# Patient Record
Sex: FEMALE | Race: WHITE | Employment: UNEMPLOYED | URBAN - METROPOLITAN AREA
[De-identification: names, ages, dates, MRNs, and addresses within clinical notes are randomized per-mention and may not be internally consistent; named-entity substitution may affect disease eponyms.]

---

## 2020-07-08 ENCOUNTER — OFFICE VISIT (OUTPATIENT)
Dept: OBGYN CLINIC | Age: 70
End: 2020-07-08
Payer: MEDICARE

## 2020-07-08 VITALS
BODY MASS INDEX: 29.3 KG/M2 | DIASTOLIC BLOOD PRESSURE: 76 MMHG | WEIGHT: 171.6 LBS | HEIGHT: 64 IN | SYSTOLIC BLOOD PRESSURE: 122 MMHG

## 2020-07-08 PROCEDURE — G8399 PT W/DXA RESULTS DOCUMENT: HCPCS | Performed by: OBSTETRICS & GYNECOLOGY

## 2020-07-08 PROCEDURE — 4040F PNEUMOC VAC/ADMIN/RCVD: CPT | Performed by: OBSTETRICS & GYNECOLOGY

## 2020-07-08 PROCEDURE — 1123F ACP DISCUSS/DSCN MKR DOCD: CPT | Performed by: OBSTETRICS & GYNECOLOGY

## 2020-07-08 PROCEDURE — G8417 CALC BMI ABV UP PARAM F/U: HCPCS | Performed by: OBSTETRICS & GYNECOLOGY

## 2020-07-08 PROCEDURE — 99213 OFFICE O/P EST LOW 20 MIN: CPT | Performed by: OBSTETRICS & GYNECOLOGY

## 2020-07-08 PROCEDURE — G8427 DOCREV CUR MEDS BY ELIG CLIN: HCPCS | Performed by: OBSTETRICS & GYNECOLOGY

## 2020-07-08 PROCEDURE — 1036F TOBACCO NON-USER: CPT | Performed by: OBSTETRICS & GYNECOLOGY

## 2020-07-08 PROCEDURE — 1090F PRES/ABSN URINE INCON ASSESS: CPT | Performed by: OBSTETRICS & GYNECOLOGY

## 2020-07-08 PROCEDURE — 3017F COLORECTAL CA SCREEN DOC REV: CPT | Performed by: OBSTETRICS & GYNECOLOGY

## 2020-07-08 RX ORDER — CHOLECALCIFEROL (VITAMIN D3) 125 MCG
1 CAPSULE ORAL DAILY
COMMUNITY

## 2020-07-08 ASSESSMENT — PATIENT HEALTH QUESTIONNAIRE - PHQ9
2. FEELING DOWN, DEPRESSED OR HOPELESS: 0
SUM OF ALL RESPONSES TO PHQ QUESTIONS 1-9: 0
1. LITTLE INTEREST OR PLEASURE IN DOING THINGS: 0
SUM OF ALL RESPONSES TO PHQ9 QUESTIONS 1 & 2: 0
SUM OF ALL RESPONSES TO PHQ QUESTIONS 1-9: 0

## 2020-07-08 ASSESSMENT — ENCOUNTER SYMPTOMS
ABDOMINAL PAIN: 0
APNEA: 0
SHORTNESS OF BREATH: 0

## 2020-07-09 NOTE — PROGRESS NOTES
Subjective:      Patient ID:  Olga Chris is a 69 y.o. female with chief complaint of:  Chief Complaint   Patient presents with    New Patient     np pt here c/o possible bladder or uterine prolapse       Patient presents with concern that she may have some prolapse in the vagina. She was previously diagnosed by myself with rectocele. patient denies any bladder or bowel complications. She is just concerned that this could be worsening and wanted to be sure      Past Medical History:   Diagnosis Date    Allergic rhinitis     Visit for screening mammogram 12/08/08    cat 2     Past Surgical History:   Procedure Laterality Date    APPENDECTOMY  1997    BREAST SURGERY Left 12/11/13    Vacuum assisted bx of the left breast    COLONOSCOPY      No report available    HYSTERECTOMY      supracervical  bilateral salpingo-oophorectomy, but she has part of her cervix left. Family History   Problem Relation Age of Onset    Cancer Sister         thyroid     Current Outpatient Medications on File Prior to Visit   Medication Sig Dispense Refill    Multiple Vitamin (MULTI VITAMIN DAILY PO) Take 1 tablet by mouth      ECHINACEA-ZINC-VITAMIN C PO Take 1 tablet by mouth      Cholecalciferol (VITAMIN D) 125 MCG (5000 UT) CAPS Take 1 tablet by mouth daily       No current facility-administered medications on file prior to visit. Allergies:  Chocolate    Review of Systems   Constitutional: Negative for fatigue and fever. Respiratory: Negative for apnea and shortness of breath. Cardiovascular: Negative for chest pain and palpitations. Gastrointestinal: Negative for abdominal pain. Genitourinary: Negative for difficulty urinating, dysuria, pelvic pain, vaginal bleeding, vaginal discharge and vaginal pain (vaginal pressure). Neurological: Negative for dizziness, weakness and light-headedness.        Objective:   /76   Ht 5' 4\" (1.626 m)   Wt 171 lb 9.6 oz (77.8 kg)   BMI 29.46 kg/m²      Physical

## 2023-03-28 PROBLEM — J30.9 ALLERGIC RHINITIS: Status: ACTIVE | Noted: 2023-03-28

## 2023-03-28 PROBLEM — Z13.89 SCREENING FOR MULTIPLE CONDITIONS: Status: ACTIVE | Noted: 2023-03-28

## 2023-03-28 PROBLEM — N81.6 RECTOCELE: Status: ACTIVE | Noted: 2023-03-28

## 2023-03-28 PROBLEM — E04.2 MULTIPLE THYROID NODULES: Status: ACTIVE | Noted: 2023-03-28

## 2023-03-28 PROBLEM — E05.00 GRAVES' DISEASE: Status: ACTIVE | Noted: 2023-03-28

## 2023-03-28 PROBLEM — Z71.89 CARDIAC RISK COUNSELING: Status: ACTIVE | Noted: 2023-03-28

## 2023-03-28 PROBLEM — K64.0 GRADE I HEMORRHOIDS: Status: ACTIVE | Noted: 2023-03-28

## 2023-03-28 PROBLEM — Z00.00 ROUTINE ADULT HEALTH MAINTENANCE: Status: ACTIVE | Noted: 2023-03-28

## 2023-03-28 PROBLEM — M85.9 DISORDER OF BONE DENSITY AND STRUCTURE, UNSPECIFIED: Status: ACTIVE | Noted: 2023-03-28

## 2023-03-28 PROBLEM — R03.0 BLOOD PRESSURE ELEVATED WITHOUT HISTORY OF HTN: Status: ACTIVE | Noted: 2023-03-28

## 2023-03-28 PROBLEM — N28.1 ACQUIRED RENAL CYST: Status: ACTIVE | Noted: 2023-03-28

## 2023-03-28 PROBLEM — E05.90 HYPERTHYROIDISM: Status: ACTIVE | Noted: 2023-03-28

## 2023-03-28 PROBLEM — R73.03 PREDIABETES: Status: ACTIVE | Noted: 2023-03-28

## 2023-03-28 PROBLEM — E55.9 VITAMIN D DEFICIENCY: Status: ACTIVE | Noted: 2023-03-28

## 2023-03-28 PROBLEM — N81.9 PROLAPSE OF FEMALE PELVIC ORGANS: Status: ACTIVE | Noted: 2023-03-28

## 2023-03-28 PROBLEM — E05.90 SUBCLINICAL HYPERTHYROIDISM: Status: RESOLVED | Noted: 2023-03-28 | Resolved: 2023-03-28

## 2023-03-28 PROBLEM — E78.2 HYPERLIPIDEMIA, MIXED: Status: ACTIVE | Noted: 2023-03-28

## 2023-03-28 PROBLEM — K57.30 DIVERTICULOSIS OF COLON: Status: ACTIVE | Noted: 2023-03-28

## 2023-03-28 PROBLEM — E05.90 SUBCLINICAL HYPERTHYROIDISM: Status: ACTIVE | Noted: 2023-03-28

## 2023-03-28 PROBLEM — Z71.89 ADVANCED DIRECTIVES, COUNSELING/DISCUSSION: Status: ACTIVE | Noted: 2023-03-28

## 2023-04-10 ENCOUNTER — TELEPHONE (OUTPATIENT)
Dept: PRIMARY CARE | Facility: CLINIC | Age: 73
End: 2023-04-10
Payer: MEDICARE

## 2023-04-10 DIAGNOSIS — E05.90 HYPERTHYROIDISM: ICD-10-CM

## 2023-04-10 DIAGNOSIS — R73.03 PREDIABETES: ICD-10-CM

## 2023-04-10 DIAGNOSIS — E55.9 VITAMIN D DEFICIENCY: ICD-10-CM

## 2023-04-10 DIAGNOSIS — E78.2 HYPERLIPIDEMIA, MIXED: ICD-10-CM

## 2023-04-10 DIAGNOSIS — M85.9 DISORDER OF BONE DENSITY AND STRUCTURE, UNSPECIFIED: Primary | ICD-10-CM

## 2023-04-10 NOTE — TELEPHONE ENCOUNTER
Relayed to patient   Verbally understands.   Advised to tell lab she doesn't want to  do TSH again

## 2023-04-10 NOTE — TELEPHONE ENCOUNTER
Pt left Vm  Pt stated she has an appointment on 5/2 w/SR  Pt just had lab work done at [facility] by the endocrinologist, Dr. Sanchez- TSH, T4 and T3   Pt stated she is supposed to have blood work done before her visit with you  Pt wants you not to include the thyroid tests since they were just done  Should I order all the other  lab tests to be done?   Please advise

## 2023-04-18 LAB
ALANINE AMINOTRANSFERASE (SGPT) (U/L) IN SER/PLAS: 11 U/L (ref 7–45)
ALBUMIN (G/DL) IN SER/PLAS: 4 G/DL (ref 3.4–5)
ALBUMIN (MG/L) IN URINE: 18.6 MG/L
ALBUMIN/CREATININE (UG/MG) IN URINE: 11.2 UG/MG CRT (ref 0–30)
ALKALINE PHOSPHATASE (U/L) IN SER/PLAS: 71 U/L (ref 33–136)
ANION GAP IN SER/PLAS: 13 MMOL/L (ref 10–20)
APPEARANCE, URINE: CLEAR
ASPARTATE AMINOTRANSFERASE (SGOT) (U/L) IN SER/PLAS: 14 U/L (ref 9–39)
BACTERIA, URINE: ABNORMAL /HPF
BASOPHILS (10*3/UL) IN BLOOD BY AUTOMATED COUNT: 0.04 X10E9/L (ref 0–0.1)
BASOPHILS/100 LEUKOCYTES IN BLOOD BY AUTOMATED COUNT: 0.4 % (ref 0–2)
BILIRUBIN TOTAL (MG/DL) IN SER/PLAS: 0.6 MG/DL (ref 0–1.2)
BILIRUBIN, URINE: NEGATIVE
BLOOD, URINE: ABNORMAL
CALCIDIOL (25 OH VITAMIN D3) (NG/ML) IN SER/PLAS: 46 NG/ML
CALCIUM (MG/DL) IN SER/PLAS: 8.8 MG/DL (ref 8.6–10.3)
CARBON DIOXIDE, TOTAL (MMOL/L) IN SER/PLAS: 29 MMOL/L (ref 21–32)
CHLORIDE (MMOL/L) IN SER/PLAS: 103 MMOL/L (ref 98–107)
CHOLESTEROL (MG/DL) IN SER/PLAS: 228 MG/DL (ref 0–199)
CHOLESTEROL IN HDL (MG/DL) IN SER/PLAS: 60.2 MG/DL
CHOLESTEROL/HDL RATIO: 3.8
COLOR, URINE: YELLOW
CREATININE (MG/DL) IN SER/PLAS: 0.98 MG/DL (ref 0.5–1.05)
CREATININE (MG/DL) IN URINE: 166 MG/DL (ref 20–320)
EOSINOPHILS (10*3/UL) IN BLOOD BY AUTOMATED COUNT: 0.57 X10E9/L (ref 0–0.4)
EOSINOPHILS/100 LEUKOCYTES IN BLOOD BY AUTOMATED COUNT: 6 % (ref 0–6)
ERYTHROCYTE DISTRIBUTION WIDTH (RATIO) BY AUTOMATED COUNT: 13.9 % (ref 11.5–14.5)
ERYTHROCYTE MEAN CORPUSCULAR HEMOGLOBIN CONCENTRATION (G/DL) BY AUTOMATED: 31.8 G/DL (ref 32–36)
ERYTHROCYTE MEAN CORPUSCULAR VOLUME (FL) BY AUTOMATED COUNT: 96 FL (ref 80–100)
ERYTHROCYTES (10*6/UL) IN BLOOD BY AUTOMATED COUNT: 4.18 X10E12/L (ref 4–5.2)
ESTIMATED AVERAGE GLUCOSE FOR HBA1C: 126 MG/DL
GFR FEMALE: 60 ML/MIN/1.73M2
GLUCOSE (MG/DL) IN SER/PLAS: 103 MG/DL (ref 74–99)
GLUCOSE, URINE: NEGATIVE MG/DL
HEMATOCRIT (%) IN BLOOD BY AUTOMATED COUNT: 40.3 % (ref 36–46)
HEMOGLOBIN (G/DL) IN BLOOD: 12.8 G/DL (ref 12–16)
HEMOGLOBIN A1C/HEMOGLOBIN TOTAL IN BLOOD: 6 %
IMMATURE GRANULOCYTES/100 LEUKOCYTES IN BLOOD BY AUTOMATED COUNT: 0.3 % (ref 0–0.9)
KETONES, URINE: NEGATIVE MG/DL
LDL: 137 MG/DL (ref 0–99)
LEUKOCYTE ESTERASE, URINE: ABNORMAL
LEUKOCYTES (10*3/UL) IN BLOOD BY AUTOMATED COUNT: 9.4 X10E9/L (ref 4.4–11.3)
LYMPHOCYTES (10*3/UL) IN BLOOD BY AUTOMATED COUNT: 1.92 X10E9/L (ref 0.8–3)
LYMPHOCYTES/100 LEUKOCYTES IN BLOOD BY AUTOMATED COUNT: 20.4 % (ref 13–44)
MONOCYTES (10*3/UL) IN BLOOD BY AUTOMATED COUNT: 0.77 X10E9/L (ref 0.05–0.8)
MONOCYTES/100 LEUKOCYTES IN BLOOD BY AUTOMATED COUNT: 8.2 % (ref 2–10)
MUCUS, URINE: ABNORMAL /LPF
NEUTROPHILS (10*3/UL) IN BLOOD BY AUTOMATED COUNT: 6.1 X10E9/L (ref 1.6–5.5)
NEUTROPHILS/100 LEUKOCYTES IN BLOOD BY AUTOMATED COUNT: 64.7 % (ref 40–80)
NITRITE, URINE: NEGATIVE
PH, URINE: 5 (ref 5–8)
PLATELETS (10*3/UL) IN BLOOD AUTOMATED COUNT: 318 X10E9/L (ref 150–450)
POTASSIUM (MMOL/L) IN SER/PLAS: 4 MMOL/L (ref 3.5–5.3)
PROTEIN TOTAL: 7.1 G/DL (ref 6.4–8.2)
PROTEIN, URINE: NEGATIVE MG/DL
RBC, URINE: 3 /HPF (ref 0–5)
SODIUM (MMOL/L) IN SER/PLAS: 141 MMOL/L (ref 136–145)
SPECIFIC GRAVITY, URINE: 1.02 (ref 1–1.03)
SQUAMOUS EPITHELIAL CELLS, URINE: 1 /HPF
THYROTROPIN (MIU/L) IN SER/PLAS BY DETECTION LIMIT <= 0.05 MIU/L: 4.1 MIU/L (ref 0.44–3.98)
THYROXINE (T4) FREE (NG/DL) IN SER/PLAS: 0.79 NG/DL (ref 0.61–1.12)
TRIGLYCERIDE (MG/DL) IN SER/PLAS: 155 MG/DL (ref 0–149)
UREA NITROGEN (MG/DL) IN SER/PLAS: 18 MG/DL (ref 6–23)
UROBILINOGEN, URINE: <2 MG/DL (ref 0–1.9)
VLDL: 31 MG/DL (ref 0–40)
WBC, URINE: 2 /HPF (ref 0–5)

## 2023-05-02 ENCOUNTER — OFFICE VISIT (OUTPATIENT)
Dept: PRIMARY CARE | Facility: CLINIC | Age: 73
End: 2023-05-02
Payer: MEDICARE

## 2023-05-02 VITALS
BODY MASS INDEX: 31.05 KG/M2 | HEIGHT: 63 IN | OXYGEN SATURATION: 96 % | DIASTOLIC BLOOD PRESSURE: 85 MMHG | HEART RATE: 88 BPM | TEMPERATURE: 98.2 F | WEIGHT: 175.25 LBS | SYSTOLIC BLOOD PRESSURE: 128 MMHG

## 2023-05-02 DIAGNOSIS — Z00.00 ROUTINE ADULT HEALTH MAINTENANCE: Primary | ICD-10-CM

## 2023-05-02 DIAGNOSIS — Z13.89 SCREENING FOR MULTIPLE CONDITIONS: ICD-10-CM

## 2023-05-02 DIAGNOSIS — R03.0 BLOOD PRESSURE ELEVATED WITHOUT HISTORY OF HTN: ICD-10-CM

## 2023-05-02 DIAGNOSIS — Z71.89 CARDIAC RISK COUNSELING: ICD-10-CM

## 2023-05-02 DIAGNOSIS — E78.2 HYPERLIPIDEMIA, MIXED: ICD-10-CM

## 2023-05-02 DIAGNOSIS — K58.9 IRRITABLE BOWEL SYNDROME, UNSPECIFIED TYPE: ICD-10-CM

## 2023-05-02 DIAGNOSIS — L23.7 POISON IVY DERMATITIS: ICD-10-CM

## 2023-05-02 DIAGNOSIS — Z12.31 ENCOUNTER FOR SCREENING MAMMOGRAM FOR BREAST CANCER: ICD-10-CM

## 2023-05-02 DIAGNOSIS — E55.9 VITAMIN D DEFICIENCY: ICD-10-CM

## 2023-05-02 DIAGNOSIS — R73.01 IFG (IMPAIRED FASTING GLUCOSE): ICD-10-CM

## 2023-05-02 DIAGNOSIS — N28.1 ACQUIRED RENAL CYST: ICD-10-CM

## 2023-05-02 DIAGNOSIS — Z71.89 ADVANCED DIRECTIVES, COUNSELING/DISCUSSION: ICD-10-CM

## 2023-05-02 DIAGNOSIS — E05.90 HYPERTHYROIDISM: ICD-10-CM

## 2023-05-02 PROCEDURE — 1159F MED LIST DOCD IN RCRD: CPT | Performed by: INTERNAL MEDICINE

## 2023-05-02 PROCEDURE — G0439 PPPS, SUBSEQ VISIT: HCPCS | Performed by: INTERNAL MEDICINE

## 2023-05-02 PROCEDURE — 99497 ADVNCD CARE PLAN 30 MIN: CPT | Performed by: INTERNAL MEDICINE

## 2023-05-02 PROCEDURE — 99214 OFFICE O/P EST MOD 30 MIN: CPT | Performed by: INTERNAL MEDICINE

## 2023-05-02 PROCEDURE — G0444 DEPRESSION SCREEN ANNUAL: HCPCS | Performed by: INTERNAL MEDICINE

## 2023-05-02 PROCEDURE — G0446 INTENS BEHAVE THER CARDIO DX: HCPCS | Performed by: INTERNAL MEDICINE

## 2023-05-02 PROCEDURE — 1160F RVW MEDS BY RX/DR IN RCRD: CPT | Performed by: INTERNAL MEDICINE

## 2023-05-02 PROCEDURE — 1036F TOBACCO NON-USER: CPT | Performed by: INTERNAL MEDICINE

## 2023-05-02 PROCEDURE — 3008F BODY MASS INDEX DOCD: CPT | Performed by: INTERNAL MEDICINE

## 2023-05-02 RX ORDER — METHIMAZOLE 5 MG/1
2.5 TABLET ORAL DAILY
COMMUNITY

## 2023-05-02 RX ORDER — ZINC SULFATE 50(220)MG
50 CAPSULE ORAL DAILY
COMMUNITY

## 2023-05-02 RX ORDER — IBUPROFEN/PSEUDOEPHEDRINE HCL 200MG-30MG
1 TABLET ORAL AS NEEDED
COMMUNITY

## 2023-05-02 RX ORDER — IBUPROFEN 100 MG/5ML
1 SUSPENSION, ORAL (FINAL DOSE FORM) ORAL DAILY
COMMUNITY
Start: 2020-04-13

## 2023-05-02 RX ORDER — HYDROCORTISONE 1 %
1 CREAM (GRAM) TOPICAL AS NEEDED
COMMUNITY
Start: 2022-09-17

## 2023-05-02 RX ORDER — PREDNISONE 20 MG/1
20 TABLET ORAL 2 TIMES DAILY
Qty: 10 TABLET | Refills: 3 | Status: SHIPPED | OUTPATIENT
Start: 2023-05-02 | End: 2024-05-07 | Stop reason: WASHOUT

## 2023-05-02 RX ORDER — CHOLECALCIFEROL (VITAMIN D3) 125 MCG
125 CAPSULE ORAL DAILY
COMMUNITY
Start: 2019-08-13

## 2023-05-02 RX ORDER — PREDNISONE 20 MG/1
2 TABLET ORAL DAILY
COMMUNITY
Start: 2022-09-17 | End: 2023-05-02 | Stop reason: SDUPTHER

## 2023-05-02 ASSESSMENT — PATIENT HEALTH QUESTIONNAIRE - PHQ9
SUM OF ALL RESPONSES TO PHQ9 QUESTIONS 1 AND 2: 0
1. LITTLE INTEREST OR PLEASURE IN DOING THINGS: NOT AT ALL
2. FEELING DOWN, DEPRESSED OR HOPELESS: NOT AT ALL

## 2023-05-02 NOTE — ASSESSMENT & PLAN NOTE
Annual Wellness exam completed   Preventive Health history reviewed:  Vaccines today: none, but will think about TDAP  Labs ordered    Mammogram ordered  Cscope due 2024  Depression Screening done  Advanced Directives Discussion Completed  Cardiovascular risk discussed and if needed, lifestyle modifications recommended, including nutritional choices, exercise, and elimination of habits contributing to risk.  We agreed on a plan to reduce the current cardiovascular risk.  See ecalc ASCVD Risk  Plus for data discussed regarding risk and risk reduction opportunities.  Aspirin use/disuse was discussed after reviewing the updated guidelines.

## 2023-05-02 NOTE — PROGRESS NOTES
Assessment and Plan:  Problem List Items Addressed This Visit          High    Advanced directives, counseling/discussion    Overview     5/2/23: HCPOA: Sanjuanita Diaz (Daughter), Son (Kavon/Dayo Diaz)         Cardiac risk counseling    Overview     5/2/23: Current 10-Year ASCVD Risk:  14.7% - Intermediate Risk    ASA not rec'd based on current guidelines         Routine adult health maintenance - Primary    Overview     Adacel 10/06, Declined Tetanus vaccine  Declined COVID, Influenza, Shingles or Pneumonia vaccine  Cscope 2014 (10yrs)  Mamm 2018; 8/3/20; 8/6/21, 9/12/22  BMD 2018; 8/6/20, 9/12/22  S/p supracervical hysterectomy and BSO  4/21: Current 10-Year ASCVD Risk:  10.63% - Intermediate Risk         Current Assessment & Plan     Annual Wellness exam completed   Preventive Health history reviewed:  Vaccines today: none, but will think about TDAP  Labs ordered    Mammogram ordered  Cscope due 2024  Depression Screening done  Advanced Directives Discussion Completed  Cardiovascular risk discussed and if needed, lifestyle modifications recommended, including nutritional choices, exercise, and elimination of habits contributing to risk.  We agreed on a plan to reduce the current cardiovascular risk.  See ecalc ASCVD Risk  Plus for data discussed regarding risk and risk reduction opportunities.  Aspirin use/disuse was discussed after reviewing the updated guidelines.            Screening for multiple conditions    Overview     Depression screening completed              Medium    Acquired renal cyst    Overview     5/21 CT Urogram: 1.  Opacified portions of bilateral ureters appear unremarkable.  2. No significant hydronephrosis.  3. Left parapelvic cysts with additional tiny subcentimeter bilateral renal hypodense lesions which are too small to characterize see CT, however statistically likely represent cysts.  4. Mild bilateral pelviectasis.  US 9/12/22: Parapelvic midpole cyst measures 16 x 14 x 13 mm  today, compared to 9 x 12 x 13 mm previously, mildly larger         Current Assessment & Plan     US to assess growth         Relevant Orders    US renal complete    Blood pressure elevated without history of HTN    Overview     Goal <130/70  8/20: her bp : 165/ 103 hr 97           our bp : 143/84 hr 93  BP Controlled at home         BMI 31.0-31.9,adult    Encounter for screening mammogram for breast cancer    Relevant Orders    BI mammo bilateral screening tomosynthesis    Hyperlipidemia, mixed    Overview     Goal LDL <100  Managed with diet  On Fish Oil             Relevant Orders    Lipid Panel    Hyperthyroidism    Overview     s/p ENDO consult 8/22: She is clinically euthyroid but thyroid indices have increased substantially since April. This may reflect decreased suppression from iodine, although dose of liquid iodine she describes is quite low. It is quite possible that the activity of her Graves' disease, assuming this is the cause, has increased. This should be reflected in her thyroid-stimulating immunoglobulin result.   Started Tapazole in 2022  Managed by Endo [facility] (Dr Sanchez)         Relevant Orders    TSH with reflex to Free T4 if abnormal    Comprehensive Metabolic Panel    CBC and Auto Differential    Lipid Panel    IFG (impaired fasting glucose)    Overview     4/23 HBA1C IS 6%  Managed with diet         Relevant Orders    Urinalysis with Reflex Microscopic    Hemoglobin A1c    Albumin, urine, random    Irritable bowel syndrome    Current Assessment & Plan     Try FODMAP diet  Imodium prn         Vitamin D deficiency    Overview     Goal 40-50  ON supplement         Relevant Orders    Vitamin D, Total    Comprehensive Metabolic Panel    CBC and Auto Differential     Other Visit Diagnoses       Poison ivy dermatitis        prn prednisone    Relevant Medications    predniSONE (Deltasone) 20 mg tablet            Chief Complaint:   Medicare Wellness Exam/Comprehensive Problem Focused Follow Up and  Physical Exam    HPI:medicare wellness  Having some diarrhea, long standing  Started on Tapazole since last seen  Seeing Endo    Renal US reviewed  Slight growth of cyst    Labs reviewed:  Component      Latest Ref Rng 4/18/2023   WBC      4.4 - 11.3 x10E9/L 9.4    RBC      4.00 - 5.20 x10E12/L 4.18    HEMOGLOBIN      12.0 - 16.0 g/dL 12.8    HEMATOCRIT      36.0 - 46.0 % 40.3    MCV      80 - 100 fL 96    MCHC      32.0 - 36.0 g/dL 31.8 (L)    Platelets      150 - 450 x10E9/L 318    RED CELL DISTRIBUTION WIDTH      11.5 - 14.5 % 13.9    Neutrophils %      40.0 - 80.0 % 64.7    Immature Granulocytes %, Automated      0.0 - 0.9 % 0.3    Lymphocytes %      13.0 - 44.0 % 20.4    Monocytes %      2.0 - 10.0 % 8.2    Eosinophils %      0.0 - 6.0 % 6.0    Basophils %      0.0 - 2.0 % 0.4    Neutrophils Absolute      1.60 - 5.50 x10E9/L 6.10 (H)    Lymphocytes Absolute      0.80 - 3.00 x10E9/L 1.92    Monocytes Absolute      0.05 - 0.80 x10E9/L 0.77    Eosinophils Absolute      0.00 - 0.40 x10E9/L 0.57 (H)    Basophils Absolute      0.00 - 0.10 x10E9/L 0.04    GLUCOSE      74 - 99 mg/dL 103 (H)    SODIUM      136 - 145 mmol/L 141    POTASSIUM      3.5 - 5.3 mmol/L 4.0    CHLORIDE      98 - 107 mmol/L 103    Bicarbonate      21 - 32 mmol/L 29    Anion Gap      10 - 20 mmol/L 13    Blood Urea Nitrogen      6 - 23 mg/dL 18    Creatinine      0.50 - 1.05 mg/dL 0.98    GFR Female      >90 mL/min/1.73m2 60    Calcium      8.6 - 10.3 mg/dL 8.8    Albumin      3.4 - 5.0 g/dL 4.0    Alkaline Phosphatase      33 - 136 U/L 71    Total Protein      6.4 - 8.2 g/dL 7.1    AST      9 - 39 U/L 14    Bilirubin Total      0.0 - 1.2 mg/dL 0.6    ALT      7 - 45 U/L 11    Color, Urine      STRAW,YELLOW  YELLOW    Appearance, Urine      CLEAR  CLEAR    Specific Gravity, Urine      1.005 - 1.035  1.019    pH, Urine      5.0 - 8.0  5.0    Protein, Urine      NEGATIVE mg/dL NEGATIVE    Glucose, Urine      NEGATIVE mg/dL NEGATIVE    Blood, Urine       NEGATIVE  SMALL(1+) !    Ketones, Urine      NEGATIVE mg/dL NEGATIVE    Bilirubin, Urine      NEGATIVE  NEGATIVE    Urobilinogen, Urine      0.0 - 1.9 mg/dL <2.0    Nitrite, Urine      NEGATIVE  NEGATIVE    Leukocyte Esterase, Urine      NEGATIVE  SMALL (1+) !    CHOLESTEROL      0 - 199 mg/dL 228 (H)    HDL CHOLESTEROL      mg/dL 60.2    Cholesterol/HDL Ratio 3.8    LDL      0 - 99 mg/dL 137 (H)    VLDL      0 - 40 mg/dL 31    TRIGLYCERIDES      0 - 149 mg/dL 155 (H)    WBC, Urine      0 - 5 /HPF 2    RBC, Urine      0 - 5 /HPF 3    Squamous Epithelial Cells, Urine      /HPF 1    Bacteria, Urine      /HPF 1+ !    Mucus, Urine      /LPF 1+    ALBUMIN (MG/L) IN URINE      Not Established mg/L 18.6    Albumin/Creatine Ratio      0.0 - 30.0 ug/mg crt 11.2    Creatinine, Urine Random      20.0 - 320.0 mg/dL 166.0    Hemoglobin A1C      % 6.0 !    Estimated Average Glucose      MG/    Vitamin D, 25-Hydroxy, Total      ng/mL 46    Thyroid Stimulating Hormone      0.44 - 3.98 mIU/L 4.10 (H)    Thyroxine, Free      0.61 - 1.12 ng/dL 0.79    [facility] at [facility]  T3/T4 normal also    Patient Care Team:  Zara Calvin MD as PCP - General  [facility]   Eyal Sanchez ([facility])    Active Problem List  Patient Active Problem List   Diagnosis    Acquired renal cyst    Allergic rhinitis    Blood pressure elevated without history of HTN    Disorder of bone density and structure, unspecified    Diverticulosis of colon    Grade I hemorrhoids    Hyperthyroidism    Hyperlipidemia, mixed    Multiple thyroid nodules    IFG (impaired fasting glucose)    Prolapse of female pelvic organs    Rectocele    Vitamin D deficiency    Advanced directives, counseling/discussion    BMI 31.0-31.9,adult    Cardiac risk counseling    Routine adult health maintenance    Screening for multiple conditions    Encounter for screening mammogram for breast cancer    Irritable bowel syndrome       Comprehensive  Medical/Surgical/Social/Family History  Past Medical History:   Diagnosis Date    H/O bone density study     9/12/22: Lowest T score -1.6 10-Year Fracture Risk: Major Osteoporotic Fracture 17.9% Hip Fracture                7.5% 3/18: -2.1 FRAX 18, 3.9% 8/6/20: T score -1.8.  FRAX scores:  Major Osteoporotic Fracture: 17.8%                                                            Hip Fracture:                5.6%    H/O CT scan     8/20: CT calcium score=0 Linear subpleural atelectasis is identified within the lingula and lower lobes 5/21 CT Urogram: 1.  Opacified portions of bilateral ureters appear unremarkable. 2. No significant hydronephrosis. 3. Left parapelvic cysts with additional tiny subcentimeter bilateral renal probable cysts 4. Mild bilateral pelviectasis. 5. Extensive colonic diverticulosis    H/O diagnostic ultrasound     US THyroid 4/20: Stable multinodular goiter since December 2018. No suspicious nodules are demonstrated 12/18: RIGHT LOBE:predominantly solid nodule in the lower pole of the right upper lobe. LEFT LOBE:A mixed solid and cystic nodule in the mid left thyroid lobe  2 x 1.1 x 1.4 cm. Another mixed solid cystic nodule left thyroid lobe 1.8 x 1.3 x 1.1 cm. A. solid nodule isthmus 2 x 1.7 x 1.8 cm.    H/O diagnostic ultrasound     US thyroid: 4/16/22:  RIGHT LOBE: spongiform nodule which measures 2.1 x 1.6 x 2.4 cm. It is essentially unchanged. LEFT LOBE: 2 spongiform nodules which measure 1.9 x 1.2 x 1.5 cm and 1.8 x 1.0 x 1.3 cm and are centrally unchanged. A 3rd spongiform nodule is seen in the inferior pole the left lobe of the thyroid which measures 1.4 x 0.9 x 1.3 cm. It has decreased in size    H/O diagnostic ultrasound     US kidney 4/21: Mild nonspecific apparent left-sided hydronephrosis. Small parapelvic left renal cyst, 12mm. Otherwise unremarkable exam US bldder 2012 (-) US abd 12/18: normal US kidney 9/12/22: Parapelvic midpole cyst measures 16 x 14 x 13 mm today, compared to  9 x 12 x 13 mm previously, mildly larger    H/O echocardiogram     : There is mild upper septal left  ventricular hypertrophy. Left ventricular systolic function is normal. EF = 69   5% (2D biplane) Normal left ventricular diastolic function.  - The right ventricle is normal in size. Right ventricular systolic function is  normal.  - There are no significant valvular abnormalities.  - RVSP is 19 mmHg c/w normal PA pressure. Estimated RApressure is 0 mmHg     Past Surgical History:   Procedure Laterality Date    APPENDECTOMY  08/10/2018    Appendectomy    CATARACT EXTRACTION  08/10/2018    Cataract Surgery    COLONOSCOPY  2018    2014: [facility]; Dr. Pleitez: internal hemorrhoids, diveticulosis    FINE NEEDLE ASPIRATION  2019: FNA thyroid: Benign follicular cells, cyst contents and colloid.    PARTIAL HYSTERECTOMY  2019    supracervical hysterectomy and BSO     Social History     Social History Narrative        Homemaker, 3 kids    Retired    Nonsmoker    Occasional ETOH    ---    Family History:    M: DM, cataracts, CAD/MI ( 64)    F: Bladder CA,cataracts ( 93)    S: DM, Thyroid CA, Dementia    Son :  Gout,  Pneumonia age 50     Tobacco/Alcohol/Opioid use, as well as Illicit Drug Use was screened for/reviewed and documented in Social Documentation section of the chart and medication list as appropriate    Allergies and Medications  Uledi  Current Outpatient Medications   Medication Instructions    ascorbic acid (Vitamin C) 1,000 mg tablet 1 tablet, oral, Daily    cholecalciferol (VITAMIN D-3) 125 mcg, oral, Daily    hydrocortisone 1 % cream 1 Application, Topical, As needed    melatonin 3 mg tablet,disintegrating 1 tablet, oral, As needed    methIMAzole (TAPAZOLE) 5 mg, oral, Daily    predniSONE (DELTASONE) 20 mg, oral, 2 times daily    QUERCETIN ORAL 1 tablet, oral, As needed    zinc sulfate (Zincate) 220 (50 Zn) MG capsule 50 mg of elemental zinc, oral, Daily  "    Medications and Supplements  prescribed by me and other practitioners or clinical pharmacist (such as prescriptions, OTC's, herbal therapies and supplements) were reviewed and documented in the medical record.      Activities of Daily Living  In your present state of health, do you have any difficulty performing the following activities?:   Preparing food and eating?: No  Bathing yourself: No  Getting dressed: No  Using the toilet:No  Moving around from place to place: No  In the past year have you fallen or had a near fall?:No  Able to manage finances independently: Yes  Able to perform grocery shopping: Yes  Able to manage medications independently: Yes  Able to do housework independently: Yes  Patient self-assessment of health status? Good    Depression Screen  (Note: if answer to either of the following is \"Yes\", then a more complete depression screening is indicated)   Q1: Over the past two weeks, have you felt down, depressed or hopeless? No  Q2: Over the past two weeks, have you felt little interest or pleasure in doing things? No    Current exercise habits: daily exercise routine   Dietary issues discussed: Yes  Hearing difficulties: No  Safe in current home environment: Yes  Visual Acuity assessed: No  Cognitive Impairment No    Advance directives  Advanced Care Planning (including a Living Will, Healthcare POA, as well as specific end of life choices and/or directives), was discussed for approximately 16 minutes with the patient and/or surrogate, voluntarily, and documented in the Problem List of the medical record.     Cardiac Risk Assessment  Cardiovascular risk was discussed and, if needed, lifestyle modifications recommended, including nutritional choices, exercise, and elimination of habits contributing to risk. We agreed on a plan to reduce the current cardiovascular risk based on above discussion as needed.  Aspirin use/disuse was discussed and documented in the Problem List of the medical " "record after reviewing the updated guidelines below:    Consider low dose Aspirin ( mg) use if the benefit for cardiovascular disease prevention outweighs risk for bleeding complications.   In general, low dose ASA should be considered:  In patients WITHOUT prior MI/stroke/PAD (primary prevention):   a. Age <60: Use if 10-year cardiovascular disease risk >20%, with discussion of risks and benefits with patient  b. Age 60-<70: Use if 10-year cardiovascular disease risk >20% and low bleeding (e.g., gastrointenstinal) risk, with discussion of risks and benefits with patient  c. Age >=70: Do not use    In patients WITH prior MI/stroke/PAD (secondary prevention):   Generally use unless extremely high bleeding (e.g., gastrointenstinal) risk, with discussion of risks and benefits with patient    ROS otherwise negative aside from what was mentioned above in HPI.    Vitals  /85   Pulse 88   Temp 36.8 °C (98.2 °F)   Ht 1.6 m (5' 3\")   Wt 79.5 kg (175 lb 4 oz)   SpO2 96%   BMI 31.04 kg/m²   Body mass index is 31.04 kg/m².  Physical Exam  Gen: Alert, NAD  HEENT:  Unremarkable  Neck:  No ILDEFONSO  Respiratory:  Lungs CTAB  Cardiovascular:  Heart RRR  Neuro:  Gross motor and sensory intact  Skin:  No suspicious lesions present  Breast: No masses, or axillary lymphadenopathy  Gyn: Mild bladder prolapse; no vaginal D/C. No external vaginal or anal/perineal lesions (Pt declined chaperone)    During the course of the visit the patient was educated and counseled about age appropriate screening and preventive services. Completed preventive screenings were documented in the chart and orders were placed for outstanding screenings/procedures as documented in the Assessment and Plan.    Patient Instructions (the written plan) was given to the patient at check out.  "

## 2023-11-16 ENCOUNTER — OFFICE VISIT (OUTPATIENT)
Dept: ORTHOPEDIC SURGERY | Facility: CLINIC | Age: 73
End: 2023-11-16
Payer: MEDICARE

## 2023-11-16 ENCOUNTER — ANCILLARY PROCEDURE (OUTPATIENT)
Dept: RADIOLOGY | Facility: CLINIC | Age: 73
End: 2023-11-16
Payer: MEDICARE

## 2023-11-16 DIAGNOSIS — M25.561 RIGHT KNEE PAIN, UNSPECIFIED CHRONICITY: ICD-10-CM

## 2023-11-16 DIAGNOSIS — M17.11 PRIMARY OSTEOARTHRITIS OF RIGHT KNEE: Primary | ICD-10-CM

## 2023-11-16 PROCEDURE — 73562 X-RAY EXAM OF KNEE 3: CPT | Mod: RT

## 2023-11-16 PROCEDURE — 99203 OFFICE O/P NEW LOW 30 MIN: CPT | Performed by: ORTHOPAEDIC SURGERY

## 2023-11-16 PROCEDURE — 1036F TOBACCO NON-USER: CPT | Performed by: ORTHOPAEDIC SURGERY

## 2023-11-16 PROCEDURE — 99213 OFFICE O/P EST LOW 20 MIN: CPT | Mod: PO,25 | Performed by: ORTHOPAEDIC SURGERY

## 2023-11-16 PROCEDURE — 1159F MED LIST DOCD IN RCRD: CPT | Performed by: ORTHOPAEDIC SURGERY

## 2023-11-16 PROCEDURE — 1126F AMNT PAIN NOTED NONE PRSNT: CPT | Performed by: ORTHOPAEDIC SURGERY

## 2023-11-16 PROCEDURE — 1160F RVW MEDS BY RX/DR IN RCRD: CPT | Performed by: ORTHOPAEDIC SURGERY

## 2023-11-16 PROCEDURE — 73562 X-RAY EXAM OF KNEE 3: CPT | Mod: RIGHT SIDE | Performed by: ORTHOPAEDIC SURGERY

## 2023-11-16 PROCEDURE — 3008F BODY MASS INDEX DOCD: CPT | Performed by: ORTHOPAEDIC SURGERY
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
## 2024-03-19 ENCOUNTER — LAB (OUTPATIENT)
Dept: LAB | Facility: LAB | Age: 74
End: 2024-03-19
Payer: MEDICARE

## 2024-03-19 DIAGNOSIS — E55.9 VITAMIN D DEFICIENCY: ICD-10-CM

## 2024-03-19 DIAGNOSIS — E05.90 HYPERTHYROIDISM: ICD-10-CM

## 2024-03-19 DIAGNOSIS — R73.03 PREDIABETES: ICD-10-CM

## 2024-03-19 DIAGNOSIS — R73.01 IFG (IMPAIRED FASTING GLUCOSE): ICD-10-CM

## 2024-03-19 DIAGNOSIS — M85.9 DISORDER OF BONE DENSITY AND STRUCTURE, UNSPECIFIED: ICD-10-CM

## 2024-03-19 DIAGNOSIS — E78.2 HYPERLIPIDEMIA, MIXED: ICD-10-CM

## 2024-03-19 LAB
25(OH)D3 SERPL-MCNC: 43 NG/ML (ref 30–100)
ALBUMIN SERPL BCP-MCNC: 3.9 G/DL (ref 3.4–5)
ALP SERPL-CCNC: 71 U/L (ref 33–136)
ALT SERPL W P-5'-P-CCNC: 12 U/L (ref 7–45)
ANION GAP SERPL CALC-SCNC: 12 MMOL/L (ref 10–20)
APPEARANCE UR: CLEAR
AST SERPL W P-5'-P-CCNC: 13 U/L (ref 9–39)
BACTERIA #/AREA URNS AUTO: ABNORMAL /HPF
BASOPHILS # BLD AUTO: 0.05 X10*3/UL (ref 0–0.1)
BASOPHILS NFR BLD AUTO: 0.6 %
BILIRUB SERPL-MCNC: 0.7 MG/DL (ref 0–1.2)
BILIRUB UR STRIP.AUTO-MCNC: NEGATIVE MG/DL
BUN SERPL-MCNC: 21 MG/DL (ref 6–23)
CALCIUM SERPL-MCNC: 9 MG/DL (ref 8.6–10.3)
CHLORIDE SERPL-SCNC: 104 MMOL/L (ref 98–107)
CHOLEST SERPL-MCNC: 236 MG/DL (ref 0–199)
CHOLESTEROL/HDL RATIO: 3.5
CO2 SERPL-SCNC: 28 MMOL/L (ref 21–32)
COLOR UR: YELLOW
CREAT SERPL-MCNC: 1.05 MG/DL (ref 0.5–1.05)
CREAT UR-MCNC: 178.4 MG/DL (ref 20–320)
EGFRCR SERPLBLD CKD-EPI 2021: 56 ML/MIN/1.73M*2
EOSINOPHIL # BLD AUTO: 1.28 X10*3/UL (ref 0–0.4)
EOSINOPHIL NFR BLD AUTO: 16.1 %
ERYTHROCYTE [DISTWIDTH] IN BLOOD BY AUTOMATED COUNT: 13.9 % (ref 11.5–14.5)
EST. AVERAGE GLUCOSE BLD GHB EST-MCNC: 123 MG/DL
GLUCOSE SERPL-MCNC: 103 MG/DL (ref 74–99)
GLUCOSE UR STRIP.AUTO-MCNC: NEGATIVE MG/DL
HBA1C MFR BLD: 5.9 %
HCT VFR BLD AUTO: 39.9 % (ref 36–46)
HDLC SERPL-MCNC: 67.2 MG/DL
HGB BLD-MCNC: 12.8 G/DL (ref 12–16)
IMM GRANULOCYTES # BLD AUTO: 0.01 X10*3/UL (ref 0–0.5)
IMM GRANULOCYTES NFR BLD AUTO: 0.1 % (ref 0–0.9)
KETONES UR STRIP.AUTO-MCNC: NEGATIVE MG/DL
LDLC SERPL CALC-MCNC: 144 MG/DL
LEUKOCYTE ESTERASE UR QL STRIP.AUTO: ABNORMAL
LYMPHOCYTES # BLD AUTO: 2.28 X10*3/UL (ref 0.8–3)
LYMPHOCYTES NFR BLD AUTO: 28.6 %
MCH RBC QN AUTO: 30.8 PG (ref 26–34)
MCHC RBC AUTO-ENTMCNC: 32.1 G/DL (ref 32–36)
MCV RBC AUTO: 96 FL (ref 80–100)
MICROALBUMIN UR-MCNC: 12.8 MG/L
MICROALBUMIN/CREAT UR: 7.2 UG/MG CREAT
MONOCYTES # BLD AUTO: 0.66 X10*3/UL (ref 0.05–0.8)
MONOCYTES NFR BLD AUTO: 8.3 %
MUCOUS THREADS #/AREA URNS AUTO: ABNORMAL /LPF
NEUTROPHILS # BLD AUTO: 3.69 X10*3/UL (ref 1.6–5.5)
NEUTROPHILS NFR BLD AUTO: 46.3 %
NITRITE UR QL STRIP.AUTO: NEGATIVE
NON HDL CHOLESTEROL: 169 MG/DL (ref 0–149)
NRBC BLD-RTO: 0 /100 WBCS (ref 0–0)
PH UR STRIP.AUTO: 5 [PH]
PLATELET # BLD AUTO: 313 X10*3/UL (ref 150–450)
POTASSIUM SERPL-SCNC: 4.3 MMOL/L (ref 3.5–5.3)
PROT SERPL-MCNC: 7 G/DL (ref 6.4–8.2)
PROT UR STRIP.AUTO-MCNC: NEGATIVE MG/DL
RBC # BLD AUTO: 4.15 X10*6/UL (ref 4–5.2)
RBC # UR STRIP.AUTO: NEGATIVE /UL
RBC #/AREA URNS AUTO: ABNORMAL /HPF
SODIUM SERPL-SCNC: 140 MMOL/L (ref 136–145)
SP GR UR STRIP.AUTO: 1.02
SQUAMOUS #/AREA URNS AUTO: ABNORMAL /HPF
TRIGL SERPL-MCNC: 125 MG/DL (ref 0–149)
TSH SERPL-ACNC: 3.35 MIU/L (ref 0.44–3.98)
UROBILINOGEN UR STRIP.AUTO-MCNC: <2 MG/DL
VLDL: 25 MG/DL (ref 0–40)
WBC # BLD AUTO: 8 X10*3/UL (ref 4.4–11.3)
WBC #/AREA URNS AUTO: ABNORMAL /HPF

## 2024-03-19 PROCEDURE — 36415 COLL VENOUS BLD VENIPUNCTURE: CPT

## 2024-03-19 PROCEDURE — 82570 ASSAY OF URINE CREATININE: CPT

## 2024-03-19 PROCEDURE — 85025 COMPLETE CBC W/AUTO DIFF WBC: CPT

## 2024-03-19 PROCEDURE — 84443 ASSAY THYROID STIM HORMONE: CPT

## 2024-03-19 PROCEDURE — 80061 LIPID PANEL: CPT

## 2024-03-19 PROCEDURE — 83036 HEMOGLOBIN GLYCOSYLATED A1C: CPT

## 2024-03-19 PROCEDURE — 81001 URINALYSIS AUTO W/SCOPE: CPT

## 2024-03-19 PROCEDURE — 82306 VITAMIN D 25 HYDROXY: CPT

## 2024-03-19 PROCEDURE — 82043 UR ALBUMIN QUANTITATIVE: CPT

## 2024-03-19 PROCEDURE — 80053 COMPREHEN METABOLIC PANEL: CPT

## 2024-05-07 ENCOUNTER — TELEPHONE (OUTPATIENT)
Dept: PRIMARY CARE | Facility: CLINIC | Age: 74
End: 2024-05-07

## 2024-05-07 ENCOUNTER — OFFICE VISIT (OUTPATIENT)
Dept: PRIMARY CARE | Facility: CLINIC | Age: 74
End: 2024-05-07
Payer: MEDICARE

## 2024-05-07 VITALS
TEMPERATURE: 98.3 F | SYSTOLIC BLOOD PRESSURE: 119 MMHG | HEART RATE: 77 BPM | BODY MASS INDEX: 30.83 KG/M2 | OXYGEN SATURATION: 95 % | DIASTOLIC BLOOD PRESSURE: 77 MMHG | HEIGHT: 63 IN | WEIGHT: 174 LBS

## 2024-05-07 DIAGNOSIS — E55.9 VITAMIN D DEFICIENCY: ICD-10-CM

## 2024-05-07 DIAGNOSIS — N28.1 ACQUIRED RENAL CYST: ICD-10-CM

## 2024-05-07 DIAGNOSIS — Z71.89 ADVANCED DIRECTIVES, COUNSELING/DISCUSSION: ICD-10-CM

## 2024-05-07 DIAGNOSIS — Z12.11 SCREENING FOR COLON CANCER: ICD-10-CM

## 2024-05-07 DIAGNOSIS — Z12.31 ENCOUNTER FOR SCREENING MAMMOGRAM FOR BREAST CANCER: ICD-10-CM

## 2024-05-07 DIAGNOSIS — Z78.0 MENOPAUSE: ICD-10-CM

## 2024-05-07 DIAGNOSIS — E78.2 HYPERLIPIDEMIA, MIXED: ICD-10-CM

## 2024-05-07 DIAGNOSIS — Z00.00 ROUTINE ADULT HEALTH MAINTENANCE: Primary | ICD-10-CM

## 2024-05-07 DIAGNOSIS — E04.2 MULTIPLE THYROID NODULES: ICD-10-CM

## 2024-05-07 DIAGNOSIS — R73.01 IFG (IMPAIRED FASTING GLUCOSE): ICD-10-CM

## 2024-05-07 DIAGNOSIS — Z71.89 CARDIAC RISK COUNSELING: ICD-10-CM

## 2024-05-07 DIAGNOSIS — Z13.89 SCREENING FOR MULTIPLE CONDITIONS: ICD-10-CM

## 2024-05-07 DIAGNOSIS — N18.31 STAGE 3A CHRONIC KIDNEY DISEASE (MULTI): ICD-10-CM

## 2024-05-07 DIAGNOSIS — E05.90 HYPERTHYROIDISM: ICD-10-CM

## 2024-05-07 PROBLEM — R03.0 BLOOD PRESSURE ELEVATED WITHOUT HISTORY OF HTN: Status: RESOLVED | Noted: 2023-03-28 | Resolved: 2024-05-07

## 2024-05-07 PROBLEM — N18.30 CKD (CHRONIC KIDNEY DISEASE) STAGE 3, GFR 30-59 ML/MIN (MULTI): Status: ACTIVE | Noted: 2024-05-07

## 2024-05-07 PROBLEM — M17.10 ARTHRITIS OF KNEE: Status: ACTIVE | Noted: 2024-05-07

## 2024-05-07 PROCEDURE — 99497 ADVNCD CARE PLAN 30 MIN: CPT | Performed by: INTERNAL MEDICINE

## 2024-05-07 PROCEDURE — 99214 OFFICE O/P EST MOD 30 MIN: CPT | Performed by: INTERNAL MEDICINE

## 2024-05-07 PROCEDURE — 1036F TOBACCO NON-USER: CPT | Performed by: INTERNAL MEDICINE

## 2024-05-07 PROCEDURE — G0446 INTENS BEHAVE THER CARDIO DX: HCPCS | Performed by: INTERNAL MEDICINE

## 2024-05-07 PROCEDURE — G0444 DEPRESSION SCREEN ANNUAL: HCPCS | Performed by: INTERNAL MEDICINE

## 2024-05-07 PROCEDURE — G0439 PPPS, SUBSEQ VISIT: HCPCS | Performed by: INTERNAL MEDICINE

## 2024-05-07 PROCEDURE — 1123F ACP DISCUSS/DSCN MKR DOCD: CPT | Performed by: INTERNAL MEDICINE

## 2024-05-07 PROCEDURE — 1158F ADVNC CARE PLAN TLK DOCD: CPT | Performed by: INTERNAL MEDICINE

## 2024-05-07 PROCEDURE — 1160F RVW MEDS BY RX/DR IN RCRD: CPT | Performed by: INTERNAL MEDICINE

## 2024-05-07 PROCEDURE — 1159F MED LIST DOCD IN RCRD: CPT | Performed by: INTERNAL MEDICINE

## 2024-05-07 RX ORDER — ALBUTEROL SULFATE 90 UG/1
1-2 AEROSOL, METERED RESPIRATORY (INHALATION) EVERY 4 HOURS PRN
COMMUNITY
Start: 2024-02-08 | End: 2024-05-07 | Stop reason: WASHOUT

## 2024-05-07 RX ORDER — DOCOSAHEXAENOIC ACID/EPA 120-180 MG
3 CAPSULE ORAL DAILY
COMMUNITY

## 2024-05-07 RX ORDER — BENZONATATE 100 MG/1
100 CAPSULE ORAL 3 TIMES DAILY PRN
COMMUNITY
Start: 2024-02-08 | End: 2024-05-07 | Stop reason: WASHOUT

## 2024-05-07 ASSESSMENT — PATIENT HEALTH QUESTIONNAIRE - PHQ9
1. LITTLE INTEREST OR PLEASURE IN DOING THINGS: NOT AT ALL
2. FEELING DOWN, DEPRESSED OR HOPELESS: NOT AT ALL
SUM OF ALL RESPONSES TO PHQ9 QUESTIONS 1 AND 2: 0

## 2024-05-07 NOTE — PATIENT INSTRUCTIONS
BMI was above normal measurement. Current weight: 78.9 kg (174 lb)  Weight change since last visit (-) denotes wt loss -1.25 lbs   Weight loss needed to achieve BMI 25: 35.4 Lbs  Weight loss needed to achieve BMI 30: 7.7 Lbs  Consider SGLT2 med

## 2024-05-07 NOTE — ASSESSMENT & PLAN NOTE
Annual Wellness exam completed   Preventive Health History reviewed  Ordered:   Labs    Mammogram   BMD   Cscope

## 2024-05-07 NOTE — Clinical Note
Hi- quick question Stable (over 1 year)  benign 1.5 cm left renal sinus cyst. Whats usual rec for monitoring these, no further imaging needed? Or periodic US?

## 2024-05-07 NOTE — PROGRESS NOTES
Annual Medicare Wellness Exam/Comprehensive Problem Focused Follow Up  Chief Complaint   Patient presents with    Medicare Annual Wellness Visit Subsequent     Xrays on both knees - maybe discuss       HPI: US kidney: Stable benign 1.5 cm left renal sinus cyst.   XR Knee 11/23: AP lateral right knee x-ray shows advanced end-stage arthrosis bone-on-bone arthrosis varus deformity osteophyte formation throughout all 3 compartments.   On the lateral view there is severe patellofemoral arthrosis.   No fracture dislocation noted    Her son did XRAYs last month and said they look better    Saw Endo 7/23 (Copied):  Graves disease  -clinically and biochemically euthyroid  -continue MMI 2.5mg daily  -repeat TFTs in 3 months  -will likely try lowering/weaning off MMI in 1 year to see if remission possible  -     T3 FREE BLD; Future  -     TSH BLD; Future  -     T4 FREE/FREE THYROX; Future  Multinodular goiter  -s/p benign FNA of 2.3cm right lower and 1.8cm left lower/isthmus nodules in 1/19  -check US thyroid to monitor nodules in 1 year  F/u 1 year     Saw Ortho (Copied)  MRI in 2015 that showed some degenerative meniscus tearing moderate arthritic change which is progressed significantly with new x-rays today showing completely bone-on-bone bone arthrosis varus deformity with complete loss of the medial and patellofemoral joint space and osteophyte formation     Labs reviewed:  Component      Latest Ref Rng 3/19/2024   LEUKOCYTES (10*3/UL) IN BLOOD BY AUTOMATED COUNT, Y[place]uba      4.4 - 11.3 x10*3/uL 8.0    nRBC      0.0 - 0.0 /100 WBCs 0.0    ERYTHROCYTES (10*6/UL) IN BLOOD BY AUTOMATED COUNT, [place]      4.00 - 5.20 x10*6/uL 4.15    HEMOGLOBIN      12.0 - 16.0 g/dL 12.8    HEMATOCRIT      36.0 - 46.0 % 39.9    MCV      80 - 100 fL 96    MCH      26.0 - 34.0 pg 30.8    MCHC      32.0 - 36.0 g/dL 32.1    RED CELL DISTRIBUTION WIDTH      11.5 - 14.5 % 13.9    PLATELETS (10*3/UL) IN BLOOD AUTOMATED COUNT, Y[place]uba      150  - 450 x10*3/uL 313    NEUTROPHILS/100 LEUKOCYTES IN BLOOD BY AUTOMATED COUNT, [facility]      40.0 - 80.0 % 46.3    Immature Granulocytes %, Automated      0.0 - 0.9 % 0.1    Lymphocytes %      13.0 - 44.0 % 28.6    Monocytes %      2.0 - 10.0 % 8.3    Eosinophils %      0.0 - 6.0 % 16.1    Basophils %      0.0 - 2.0 % 0.6    NEUTROPHILS (10*3/UL) IN BLOOD BY AUTOMATED COUNT, [facility]      1.60 - 5.50 x10*3/uL 3.69    Immature Granulocytes Absolute, Automated      0.00 - 0.50 x10*3/uL 0.01    Lymphocytes Absolute      0.80 - 3.00 x10*3/uL 2.28    Monocytes Absolute      0.05 - 0.80 x10*3/uL 0.66    Eosinophils Absolute      0.00 - 0.40 x10*3/uL 1.28 (H)    Basophils Absolute      0.00 - 0.10 x10*3/uL 0.05    GLUCOSE      74 - 99 mg/dL 103 (H)    SODIUM      136 - 145 mmol/L 140    POTASSIUM      3.5 - 5.3 mmol/L 4.3    CHLORIDE      98 - 107 mmol/L 104    Bicarbonate      21 - 32 mmol/L 28    Anion Gap      10 - 20 mmol/L 12    Blood Urea Nitrogen      6 - 23 mg/dL 21    Creatinine      0.50 - 1.05 mg/dL 1.05    EGFR      >60 mL/min/1.73m*2 56 (L)    Calcium      8.6 - 10.3 mg/dL 9.0    Albumin      3.4 - 5.0 g/dL 3.9    Alkaline Phosphatase      33 - 136 U/L 71    Total Protein      6.4 - 8.2 g/dL 7.0    AST      9 - 39 U/L 13    Bilirubin Total      0.0 - 1.2 mg/dL 0.7    ALT      7 - 45 U/L 12    Color, Urine      Straw, Yellow  Yellow    Appearance, Urine      Clear  Clear    Specific Gravity, Urine      1.005 - 1.035  1.018    pH, Urine      5.0, 5.5, 6.0, 6.5, 7.0, 7.5, 8.0  5.0    Protein, Urine      NEGATIVE mg/dL NEGATIVE    Glucose, Urine      NEGATIVE mg/dL NEGATIVE    Blood, Urine      NEGATIVE  NEGATIVE    Ketones, Urine      NEGATIVE mg/dL NEGATIVE    Bilirubin, Urine      NEGATIVE  NEGATIVE    Urobilinogen, Urine      <2.0 mg/dL <2.0    Nitrite, Urine      NEGATIVE  NEGATIVE    Leukocyte Esterase, Urine      NEGATIVE  SMALL (1+) !    CHOLESTEROL      0 - 199 mg/dL 236 (H)    HDL CHOLESTEROL      mg/dL  67.2    Cholesterol/HDL Ratio 3.5    LDL Calculated      <=99 mg/dL 144 (H)    VLDL      0 - 40 mg/dL 25    TRIGLYCERIDES      0 - 149 mg/dL 125    Non HDL Cholesterol      0 - 149 mg/dL 169 (H)    WBC, Urine      1-5, NONE /HPF 1-5    RBC, Urine      NONE, 1-2, 3-5 /HPF 1-2    Squamous Epithelial Cells, Urine      Reference range not established. /HPF 1-9 (SPARSE)    Bacteria, Urine      NONE SEEN /HPF 1+ !    Mucus, Urine      Reference range not established. /LPF 1+    Albumin, Urine Random      Not established mg/L 12.8    Creatinine, Urine Random      20.0 - 320.0 mg/dL 178.4    Albumin/Creatine Ratio      <30.0 ug/mg Creat 7.2    Hemoglobin A1C      see below % 5.9 (H)    Estimated Average Glucose      Not Established mg/dL 123    Vitamin D, 25-Hydroxy, Total      30 - 100 ng/mL 43    Thyroid Stimulating Hormone      0.44 - 3.98 mIU/L 3.35    Increased her FO to 3/day after this    Assessment and Plan:  Problem List Items Addressed This Visit          High    Routine adult health maintenance - Primary    Overview     Adacel 10/06, Declined Tetanus vaccine  Declined COVID, Influenza, Shingles or Pneumonia vaccine  Cscope 2014 (10yrs)  Mamm 2018; 8/3/20; 8/6/21, 9/12/22; 9/23  BMD 2018; 8/6/20, 9/12/22  S/p supracervical hysterectomy and BSO  4/21: Current 10-Year ASCVD Risk:  10.63% - Intermediate Risk  8/20: her bp : 165/ 103 hr 97 our bp : 143/84 hr 93          Current Assessment & Plan     Annual Wellness exam completed   Preventive Health History reviewed  Ordered:   Labs    Mammogram   BMD   Cscope               Medium    Acquired renal cyst    Overview     5/21 CT Urogram: 1.  Opacified portions of bilateral ureters appear unremarkable.  2. No significant hydronephrosis.  3. Left parapelvic cysts with additional tiny subcentimeter bilateral renal hypodense lesions which are too small to characterize see CT, however statistically likely represent cysts.  4. Mild bilateral pelviectasis.  US 9/12/22:  Parapelvic midpole cyst measures 16 x 14 x 13 mm today, compared to 9 x 12 x 13 mm previously, mildly larger  US 9/23: 1.  No significant sonographic abnormality in either kidney.  2. Stable benign 1.5 cm left renal sinus cyst         Current Assessment & Plan     Plan on US in 2 years   Emailed urology to inquire on recs         Advanced directives, counseling/discussion    Overview     5/7/24: HCPOA: Sanjuanita Diaz (Daughter), Son (Kavon/Dayo Diaz)  FULL CODE         BMI 31.0-31.9,adult    Current Assessment & Plan     BMI was above normal measurement. Current weight: 78.9 kg (174 lb)  Weight change since last visit (-) denotes wt loss -1.25 lbs   Weight loss needed to achieve BMI 25: 35.4 Lbs  Weight loss needed to achieve BMI 30: 7.7 Lbs  Consider SGLT2 med          Cardiac risk counseling    Overview     5/7/24:  Current 10-Year ASCVD Risk:  14.7% - Intermediate Risk    ASA not rec'd based on current guidelines         CKD (chronic kidney disease) stage 3, GFR 30-59 ml/min (Multi)    Overview     Avoid NSAIDs  Hydrate well daily 64-80 oz daily         Current Assessment & Plan     Consider SGLT2 med for renoprotection and IFG         Relevant Orders    Referral to Clinical Pharmacy    Comprehensive Metabolic Panel    CBC and Auto Differential    Urinalysis with Reflex Culture and Microscopic    Albumin, urine, random    Encounter for screening mammogram for breast cancer    Relevant Orders    BI mammo bilateral screening tomosynthesis    Hyperlipidemia, mixed    Overview     2020: CT calcium score =0  Goal LDL <130  Managed with diet  On Fish Oil             Current Assessment & Plan     Work on diet         Relevant Orders    Lipid Panel    Lipid panel    Hyperthyroidism    Overview     s/p ENDO consult 8/22: She is clinically euthyroid but thyroid indices have increased substantially since April. This may reflect decreased suppression from iodine, although dose of liquid iodine she describes is quite low. It is  "quite possible that the activity of her Graves' disease, assuming this is the cause, has increased. This should be reflected in her thyroid-stimulating immunoglobulin result.   Started Tapazole in 2022  Managed by Endo [facility] (Dr Sanchez)         Current Assessment & Plan     Endo considering weaning off MMI         Relevant Orders    TSH with reflex to Free T4 if abnormal    T3    T4    IFG (impaired fasting glucose)    Overview     4/23 HBA1C IS 6%  Managed with diet         Current Assessment & Plan     Consider SGLT2 med given CKD          Relevant Orders    Hemoglobin A1c    Menopause    Relevant Orders    XR DEXA bone density    Multiple thyroid nodules    Overview     US 4/16/22:  RIGHT LOBE: spongiform nodule which measures 2.1 x 1.6 x 2.4 cm. It is essentially unchanged.  LEFT LOBE: 2 spongiform nodules which measure 1.9 x 1.2 x 1.5 cm and 1.8 x 1.0 x 1.3 cm and are centrally unchanged. A 3rd spongiform nodule is seen in the inferior pole the left lobe of the thyroid which measures 1.4 x 0.9 x 1.3 cm. It has decreased in size  1/19: FNA thyroid: Benign follicular cells, cyst contents and colloid.  US 4/20: IMPRESSION: Stable multinodular goiter since December            Screening for colon cancer    Relevant Orders    Colonoscopy Screening; Average Risk Patient    Screening for multiple conditions    Overview     Depression screening completed           Vitamin D deficiency    Overview     Goal 40-50  ON supplement         Relevant Orders    Vitamin D 25-Hydroxy,Total (for eval of Vitamin D levels)     ROS otherwise negative aside from what was mentioned above in HPI.    Vitals  /77   Pulse 77   Temp 36.8 °C (98.3 °F)   Ht 1.588 m (5' 2.5\")   Wt 78.9 kg (174 lb)   SpO2 95%   BMI 31.32 kg/m²   Body mass index is 31.32 kg/m².  Physical Exam  Gen: Alert, NAD  HEENT:  Unremarkable  Neck:  No ILDEFONSO  Respiratory:  Lungs CTAB  Cardiovascular:  Heart RRR  Neuro:  Gross motor and sensory intact  Knee: +OA, " slightly swollen  Skin:  No suspicious lesions present  Breast: No masses, or axillary lymphadenopathy  Gyn: Normal pelvic exam: no uterine masses. No external vaginal or anal/perineal lesions (Pt declined chaperone)    Patient Care Team:  Zara Calvin MD as PCP - General  Zara Calvin MD as PCP - [facility]ributed Provider  Catracho Cody MD as Consulting Physician (Orthopaedic Surgery)     Activities of Daily Living  In your present state of health, do you have any difficulty performing the following activities?:   Preparing food and eating?: No  Bathing yourself: No  Getting dressed: No  Using the toilet:No  Moving around from place to place: No  In the past year have you fallen or had a near fall?:No  Able to manage finances independently: Yes  Able to perform grocery shopping: Yes  Able to manage medications independently: Yes  Able to do housework independently: Yes  Patient self-assessment of health status? Good    Current exercise habits: moving and keeping busy   Dietary issues discussed: Yes  Hearing difficulties: No  Safe in current home environment: Yes  Visual Acuity assessed: No  Cognitive Impairment No  Allergies and Medications  Chocolate  Current Outpatient Medications   Medication Instructions    ascorbic acid (Vitamin C) 1,000 mg tablet 1 tablet, oral, Daily    calcium crb,[id number] 300-200-13.5 mg-unit-mg tablet 1 tablet, oral, Daily    cholecalciferol (VITAMIN D-3) 125 mcg, oral, Daily    fish oil concentrate (Fish OiL) 120-180 mg capsule 3 g, oral, Daily    hydrocortisone 1 % cream 1 Application, Topical, As needed    melatonin 3 mg tablet,disintegrating 1 tablet, oral, As needed    methIMAzole (TAPAZOLE) 2.5 mg, oral, Daily    QUERCETIN ORAL 1 tablet, oral, As needed    zinc sulfate (Zincate) 220 (50 Zn) MG capsule 50 mg of elemental zinc, oral, Daily       Medications and Supplements  prescribed by me and other practitioners or clinical pharmacist (such as prescriptions,  OTC's, herbal therapies and supplements) were reviewed and documented in the medical record.      Active Problem List  Patient Active Problem List   Diagnosis    Acquired renal cyst    Allergic rhinitis    Disorder of bone density and structure, unspecified    Diverticulosis of colon    Grade I hemorrhoids    Hyperthyroidism    Hyperlipidemia, mixed    Multiple thyroid nodules    IFG (impaired fasting glucose)    Prolapse of female pelvic organs    Rectocele    Vitamin D deficiency    Advanced directives, counseling/discussion    BMI 31.0-31.9,adult    Cardiac risk counseling    Routine adult health maintenance    Screening for multiple conditions    Encounter for screening mammogram for breast cancer    Irritable bowel syndrome    Arthritis of knee    CKD (chronic kidney disease) stage 3, GFR 30-59 ml/min (Multi)    Menopause    Screening for colon cancer       Comprehensive Medical/Surgical/Social/Family History  Past Medical History:   Diagnosis Date    H/O bone density study     9/12/22: Lowest T score -1.6 10-Year Fracture Risk: Major Osteoporotic Fracture 17.9% Hip Fracture                7.5% 3/18: -2.1 FRAX 18, 3.9% 8/6/20: T score -1.8.  FRAX scores:  Major Osteoporotic Fracture: 17.8%                                                            Hip Fracture:                5.6%    H/O CT scan     8/20: CT calcium score=0 Linear subpleural atelectasis is identified within the lingula and lower lobes 5/21 CT Urogram: 1.  Opacified portions of bilateral ureters appear unremarkable. 2. No significant hydronephrosis. 3. Left parapelvic cysts with additional tiny subcentimeter bilateral renal probable cysts 4. Mild bilateral pelviectasis. 5. Extensive colonic diverticulosis    H/O diagnostic ultrasound     US THyroid 4/20: Stable multinodular goiter since December 2018. No suspicious nodules are demonstrated 12/18: RIGHT LOBE:predominantly solid nodule in the lower pole of the right upper lobe. LEFT LOBE:A mixed  solid and cystic nodule in the mid left thyroid lobe  2 x 1.1 x 1.4 cm. Another mixed solid cystic nodule left thyroid lobe 1.8 x 1.3 x 1.1 cm. A. solid nodule isthmus 2 x 1.7 x 1.8 cm.    H/O diagnostic ultrasound     US thyroid: 4/16/22:  RIGHT LOBE: spongiform nodule which measures 2.1 x 1.6 x 2.4 cm. It is essentially unchanged. LEFT LOBE: 2 spongiform nodules which measure 1.9 x 1.2 x 1.5 cm and 1.8 x 1.0 x 1.3 cm and are centrally unchanged. A 3rd spongiform nodule is seen in the inferior pole the left lobe of the thyroid which measures 1.4 x 0.9 x 1.3 cm. It has decreased in size    H/O diagnostic ultrasound     US kidney 4/21: Mild nonspecific apparent left-sided hydronephrosis. Small parapelvic left renal cyst, 12mm. Otherwise unremarkable exam US bldder 2012 (-) US abd 12/18: normal US kidney 9/12/22: Parapelvic midpole cyst measures 16 x 14 x 13 mm today, compared to 9 x 12 x 13 mm previously, mildly larger    H/O diagnostic ultrasound 09/16/2023    US kidney: 1.  No significant sonographic abnormality in either kidney. 2. Stable benign 1.5 cm left renal sinus cyst    H/O echocardiogram     1/18: There is mild upper septal left  ventricular hypertrophy. Left ventricular systolic function is normal. EF = 69   5% (2D biplane) Normal left ventricular diastolic function.  - The right ventricle is normal in size. Right ventricular systolic function is  normal.  - There are no significant valvular abnormalities.  - RVSP is 19 mmHg c/w normal PA pressure. Estimated RApressure is 0 mmHg    H/O x-ray of lower extremity 05/07/2024    XR Knee 11/23: AP lateral right knee x-ray shows advanced end-stage arthrosis bone-on-bone arthrosis varus deformity osteophyte formation throughout all 3 compartments.  On the lateral view there is severe patellofemoral arthrosis.  No fracture dislocation noted     Past Surgical History:   Procedure Laterality Date    APPENDECTOMY  08/10/2018    Appendectomy    CATARACT EXTRACTION   08/10/2018    Cataract Surgery    COLONOSCOPY  2018    2014: [facility]; Dr. Pleitez: internal hemorrhoids, diveticulosis    FINE NEEDLE ASPIRATION  2019: FNA thyroid: Benign follicular cells, cyst contents and colloid.    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  (See Rooming Screening section for documentation, and/or progress note for additional information)    Cardiac Risk Assessment (15 minutes spent on this)  The 10-year ASCVD risk score (Juan PONCE, et al., 2019) is: 14.3%    Values used to calculate the score:      Age: 74 years      Sex: Female      Is Non- : No      Diabetic: No      Tobacco smoker: No      Systolic Blood Pressure: 119 mmHg      Is BP treated: No      HDL Cholesterol: 67.2 mg/dL      Total Cholesterol: 236 mg/dL    Cardiovascular risk was discussed and, if needed, lifestyle modifications recommended, including nutritional choices, exercise, and elimination of habits contributing to risk. We agreed on a plan to reduce the current cardiovascular risk based on above discussion as needed.     Aspirin use/disuse was discussed and documented in the Problem List of the medical record (under Cardiac Risk Counseling) after reviewing the updated  guidelines below:  Consider low dose Aspirin ( mg) use if the benefit for cardiovascular disease prevention outweighs risk for bleeding complications.   Discussed that in general, low dose ASA should be considered:  In patients WITHOUT prior MI/stroke/PAD (primary prevention):   a. Age <60: Use if 10-year cardiovascular disease risk >20%, with discussion of risks and benefits with patient  b. Age 60-<70: Use if 10-year cardiovascular disease risk >20% and low bleeding (e.g., gastrointestinal) risk, with discussion of risks and benefits with patient  c. Age >=70: Do not use    In patients WITH prior MI/stroke/PAD (secondary prevention):   Generally use unless extremely high bleeding (e.g., gastrointenstinal) risk, with discussion of risks and benefits with patient    Advance Directives Discussion  Advanced Care Planning (including a Living Will, Healthcare POA, as well as specific end of life choices and/or directives), was discussed with the patient and/or surrogate, voluntarily, and details of that discussion documented in the Problem List (under Advanced Directives Discussion) of the medical record.   (16 min spent discussing above)     During the course of the visit the patient was educated and counseled about age appropriate screening and preventive services.   Completed preventive screenings were documented in the chart (see Routine Health Maintenance in Problem List) and orders were placed for outstanding screenings/procedures as documented in the Assessment and Plan.    Patient Instructions (the written plan) was given to the patient at check out.

## 2024-05-07 NOTE — TELEPHONE ENCOUNTER
----- Message from Zara Calvin MD sent at 5/7/2024  4:56 PM EDT -----  Let her know just as we thought  Not worrisome and no follow up needed  ----- Message -----  From: Dano Haskins MD  Sent: 5/7/2024   4:50 PM EDT  To: Zara Calvin MD    No follow-up imaging necessary  ----- Message -----  From: Zara Calvin MD  Sent: 5/7/2024   3:20 PM EDT  To: Dano Haskins MD    Hi- quick question  Stable (over 1 year)  benign 1.5 cm left renal sinus cyst.  Whats usual rec for monitoring these, no further imaging needed? Or periodic US?

## 2024-06-06 ENCOUNTER — TELEPHONE (OUTPATIENT)
Dept: GASTROENTEROLOGY | Facility: CLINIC | Age: 74
End: 2024-06-06
Payer: MEDICARE

## 2024-06-06 DIAGNOSIS — Z12.11 COLON CANCER SCREENING: ICD-10-CM

## 2024-06-06 NOTE — TELEPHONE ENCOUNTER
Left voicemail for patient regarding scheduling an appointment. Patient is 74 and must be seen in office for a consultation before her procedure on 9/11/24 with Dr. Eisenberg. Provided patient with office number, [phone].

## 2024-06-07 RX ORDER — POLYETHYLENE GLYCOL 3350, SODIUM CHLORIDE, SODIUM BICARBONATE AND POTASSIUM CHLORIDE 420; 5.72; 11.2; 1.48 G/4L; G/4L; G/4L; G/4L
4000 POWDER, FOR SOLUTION ORAL ONCE
Qty: 4000 ML | Refills: 0 | Status: SHIPPED | OUTPATIENT
Start: 2024-06-07 | End: 2024-06-07

## 2024-06-18 ENCOUNTER — LAB (OUTPATIENT)
Dept: LAB | Facility: LAB | Age: 74
End: 2024-06-18
Payer: MEDICARE

## 2024-06-18 DIAGNOSIS — E78.2 HYPERLIPIDEMIA, MIXED: ICD-10-CM

## 2024-06-18 LAB
CHOLEST SERPL-MCNC: 203 MG/DL (ref 0–199)
CHOLESTEROL/HDL RATIO: 3
HDLC SERPL-MCNC: 67 MG/DL
LDLC SERPL CALC-MCNC: 110 MG/DL
NON HDL CHOLESTEROL: 136 MG/DL (ref 0–149)
TRIGL SERPL-MCNC: 132 MG/DL (ref 0–149)
VLDL: 26 MG/DL (ref 0–40)

## 2024-06-18 PROCEDURE — 36415 COLL VENOUS BLD VENIPUNCTURE: CPT

## 2024-06-18 PROCEDURE — 80061 LIPID PANEL: CPT

## 2024-07-24 ENCOUNTER — APPOINTMENT (OUTPATIENT)
Dept: GASTROENTEROLOGY | Facility: CLINIC | Age: 74
End: 2024-07-24
Payer: MEDICARE

## 2024-07-26 ENCOUNTER — TELEPHONE (OUTPATIENT)
Dept: PRIMARY CARE | Facility: CLINIC | Age: 74
End: 2024-07-26
Payer: MEDICARE

## 2024-07-26 DIAGNOSIS — L23.7 POISON IVY DERMATITIS: ICD-10-CM

## 2024-07-26 RX ORDER — PREDNISONE 20 MG/1
20 TABLET ORAL 2 TIMES DAILY
Qty: 10 TABLET | Refills: 3 | Status: SHIPPED | OUTPATIENT
Start: 2024-07-26

## 2024-07-26 NOTE — TELEPHONE ENCOUNTER
Patient left  asking to refill Prednisone 20 mg for her poison oak/ivy.  I did not see it in her medication list.  Please advise

## 2024-07-29 PROBLEM — M17.11 OSTEOARTHRITIS OF RIGHT KNEE: Status: ACTIVE | Noted: 2024-07-29

## 2024-07-29 PROBLEM — R93.2 ABNORMAL FINDINGS ON IMAGING OF BILIARY TRACT: Status: ACTIVE | Noted: 2023-03-28

## 2024-07-29 PROBLEM — N94.9 DISCOMFORT OF VAGINA: Status: ACTIVE | Noted: 2024-07-29

## 2024-07-29 PROBLEM — M25.561 RIGHT KNEE PAIN: Status: ACTIVE | Noted: 2024-07-29

## 2024-07-29 PROBLEM — I10 PRIMARY HYPERTENSION: Status: ACTIVE | Noted: 2024-07-29

## 2024-07-29 PROBLEM — N28.9 ABNORMAL KIDNEY FUNCTION: Status: ACTIVE | Noted: 2024-07-29

## 2024-07-29 PROBLEM — N13.30 HYDRONEPHROSIS: Status: ACTIVE | Noted: 2024-07-29

## 2024-07-29 PROBLEM — N81.12 LATERAL CYSTOCELE: Status: ACTIVE | Noted: 2023-03-28

## 2024-07-29 PROBLEM — L25.9 CONTACT DERMATITIS: Status: ACTIVE | Noted: 2024-07-29

## 2024-07-29 PROBLEM — F43.21 GRIEF: Status: ACTIVE | Noted: 2024-07-29

## 2024-07-31 ENCOUNTER — APPOINTMENT (OUTPATIENT)
Dept: GASTROENTEROLOGY | Facility: CLINIC | Age: 74
End: 2024-07-31
Payer: MEDICARE

## 2024-07-31 VITALS
SYSTOLIC BLOOD PRESSURE: 127 MMHG | OXYGEN SATURATION: 91 % | BODY MASS INDEX: 31.1 KG/M2 | WEIGHT: 175.5 LBS | TEMPERATURE: 98.3 F | DIASTOLIC BLOOD PRESSURE: 84 MMHG | HEART RATE: 94 BPM | HEIGHT: 63 IN

## 2024-07-31 DIAGNOSIS — Z12.11 COLON CANCER SCREENING: Primary | ICD-10-CM

## 2024-07-31 PROCEDURE — 1160F RVW MEDS BY RX/DR IN RCRD: CPT | Performed by: NURSE PRACTITIONER

## 2024-07-31 PROCEDURE — 3074F SYST BP LT 130 MM HG: CPT | Performed by: NURSE PRACTITIONER

## 2024-07-31 PROCEDURE — 99202 OFFICE O/P NEW SF 15 MIN: CPT | Performed by: NURSE PRACTITIONER

## 2024-07-31 PROCEDURE — 3079F DIAST BP 80-89 MM HG: CPT | Performed by: NURSE PRACTITIONER

## 2024-07-31 PROCEDURE — 1123F ACP DISCUSS/DSCN MKR DOCD: CPT | Performed by: NURSE PRACTITIONER

## 2024-07-31 PROCEDURE — 1159F MED LIST DOCD IN RCRD: CPT | Performed by: NURSE PRACTITIONER

## 2024-07-31 NOTE — PROGRESS NOTES
Subjective   Patient ID: Bridgette Diaz is a 74 y.o. female who presents for Colon Cancer Screening (Patient presents for colon consult. Last colon was 10 years ago with Dr. Eng. /PCP Dr. Calvin states that she due for colon. About six months ago had some bouts of diarrhea. She contributes this to changing the routine of her vitamins. /Denies blood/mucus/nausea/vomiting/diarrhea. /).  HPI  Patient presents to the GI clinic accompanied by her daughter, Ines.  Patient is scheduled for a screening colonoscopy on 9/11/2024.  She has some questions about her supplements.  She takes vitamins daily.  Sister diagnosed with CRC at the age of 76.  Patient without any GI complaints.  No bowel habit changes or change in stool caliber.  No melena or hematochezia.  Weight and appetite are stable.  No excessive alcohol.  No smoking or NSAIDs.    Colonoscopy 2014: Diverticulosis in the sigmoid, descending, transverse and in the hepatic flexure.  Internal hemorrhoids.  Current Outpatient Medications on File Prior to Visit   Medication Sig Dispense Refill    ascorbic acid (Vitamin C) 1,000 mg tablet Take 1 tablet (1,000 mg) by mouth once daily.      calcium crb,[id number] 300-200-13.5 mg-unit-mg tablet Take 1 tablet by mouth once daily.      cholecalciferol (Vitamin D-3) 125 MCG (5000 UT) capsule Take 1 capsule (125 mcg) by mouth once daily.      fish oil concentrate (Fish OiL) 120-180 mg capsule Take 3 capsules (3 g) by mouth once daily.      melatonin 3 mg tablet,disintegrating Take 1 tablet (3 mg) by mouth if needed.      methIMAzole (Tapazole) 5 mg tablet Take 0.5 tablets (2.5 mg) by mouth once daily.      QUERCETIN ORAL Take 1 tablet by mouth if needed.      zinc sulfate (Zincate) 220 (50 Zn) MG capsule Take 1 capsule (50 mg of elemental zinc) by mouth once daily.      hydrocortisone 1 % cream Apply 1 Application topically if needed.      [DISCONTINUED] predniSONE (Deltasone) 20 mg tablet Take 1 tablet (20 mg) by  "mouth 2 times a day. (Patient not taking: Reported on 7/31/2024) 10 tablet 3     No current facility-administered medications on file prior to visit.        Review of Systems   All other systems reviewed and are negative.      Objective   Physical Exam  Vitals reviewed.   Constitutional:       General: She is awake. She is not in acute distress.     Appearance: Normal appearance. She is well-developed. She is not ill-appearing, toxic-appearing or diaphoretic.   HENT:      Head: Normocephalic and atraumatic.   Eyes:      General: No scleral icterus.     Pupils: Pupils are equal, round, and reactive to light.   Cardiovascular:      Rate and Rhythm: Normal rate and regular rhythm.      Heart sounds: Normal heart sounds. No murmur heard.  Pulmonary:      Effort: Pulmonary effort is normal. No respiratory distress.      Breath sounds: Normal breath sounds.   Abdominal:      General: Abdomen is protuberant. Bowel sounds are normal.      Palpations: Abdomen is soft. There is no hepatomegaly.      Tenderness: There is no abdominal tenderness.   Musculoskeletal:         General: Normal range of motion.      Cervical back: Normal range of motion.      Right lower leg: No edema.      Left lower leg: No edema.   Skin:     General: Skin is warm and dry.      Coloration: Skin is not jaundiced or pale.   Neurological:      General: No focal deficit present.      Mental Status: She is alert and oriented to person, place, and time.      Motor: No weakness.      Gait: Gait normal.   Psychiatric:         Mood and Affect: Mood normal.         Behavior: Behavior is cooperative.         Thought Content: Thought content normal.         Judgment: Judgment normal.       /84 (BP Location: Right arm, Patient Position: Sitting, BP Cuff Size: Large adult)   Pulse 94   Temp 36.8 °C (98.3 °F) (Temporal)   Ht 1.588 m (5' 2.5\")   Wt 79.6 kg (175 lb 8 oz)   SpO2 91%   BMI 31.59 kg/m²      Lab Results   Component Value Date    WBC 8.0 " "03/19/2024    HGB 12.8 03/19/2024    HCT 39.9 03/19/2024    MCV 96 03/19/2024     03/19/2024           No lab exists for component: \"LABALBU\"    No results found for: \"AFP\"  Lab Results   Component Value Date    TSH 3.35 03/19/2024         Assessment/Plan   Diagnoses and all orders for this visit:  Colon cancer screening  74-year-old female scheduled for a screening colonoscopy on 9/11/2024.  The procedure was discussed at length, including all possible risks.  Importance of the bowel prep was stressed to the patient for optimal results.  Hold supplements and vitamins 7 days prior to colonoscopy.  No NSAIDs 7 days prior to colonoscopy.  All of these instructions were reviewed with the patient.  Of note family history of CRC with her sister, but diagnosed at the age of 76.  No red flag symptoms noted.  She can follow-up as needed.         "

## 2024-08-22 ENCOUNTER — TELEPHONE (OUTPATIENT)
Dept: PRIMARY CARE | Facility: CLINIC | Age: 74
End: 2024-08-22
Payer: MEDICARE

## 2024-08-22 ENCOUNTER — TELEPHONE (OUTPATIENT)
Dept: GASTROENTEROLOGY | Facility: CLINIC | Age: 74
End: 2024-08-22
Payer: MEDICARE

## 2024-08-22 NOTE — TELEPHONE ENCOUNTER
Common with the pressure changes associates with flying  She should wear compression socks when flying to help prevent that, ambulate when allowable during the flight, and hydrate well  If symptoms persist need to worry about DVT (also a risk with long flights and immobility)  Schedule appt if feels needed  ER if symptoms worsen acutely

## 2024-08-22 NOTE — TELEPHONE ENCOUNTER
Patient left message that she was recently on a plane and came home to leg swelling and cramping in her lower legs.   She wanted to know if this had anything to do with her upcoming procedure on 9/11.   I advised the patient that she should either proceed to the ER or call her PCP to discuss what measures need to happen prior to her appointment.     She will notify the office is she has a blood clot and is placed on a blood thinner.

## 2024-08-22 NOTE — TELEPHONE ENCOUNTER
Patient left  stating she was flying Tuesday and when she was getting her connecting flight she noticed swelling around her ankles. She was on the plane and airport  for about 5 hours. When she got home both legs were swollen and she got a leg cramp.   Everything has resolved just has a small ache in the back of her leg.   Patient stated she called the GI dr because she is having a colonoscopy on 9/11 and they wanted her to check with you that you think she will be ok and its nothing worrisome.  Please advise

## 2024-08-30 ENCOUNTER — ANESTHESIA EVENT (OUTPATIENT)
Dept: GASTROENTEROLOGY | Facility: EXTERNAL LOCATION | Age: 74
End: 2024-08-30

## 2024-09-11 ENCOUNTER — APPOINTMENT (OUTPATIENT)
Dept: GASTROENTEROLOGY | Facility: EXTERNAL LOCATION | Age: 74
End: 2024-09-11
Payer: MEDICARE

## 2024-09-11 ENCOUNTER — ANESTHESIA (OUTPATIENT)
Dept: GASTROENTEROLOGY | Facility: EXTERNAL LOCATION | Age: 74
End: 2024-09-11

## 2024-09-11 VITALS
HEART RATE: 74 BPM | WEIGHT: 174 LBS | RESPIRATION RATE: 19 BRPM | OXYGEN SATURATION: 98 % | SYSTOLIC BLOOD PRESSURE: 144 MMHG | HEIGHT: 63 IN | TEMPERATURE: 98.6 F | DIASTOLIC BLOOD PRESSURE: 87 MMHG | BODY MASS INDEX: 30.83 KG/M2

## 2024-09-11 DIAGNOSIS — Z12.11 SCREENING FOR COLON CANCER: ICD-10-CM

## 2024-09-11 PROBLEM — Z86.0100 HISTORY OF COLON POLYPS: Status: ACTIVE | Noted: 2024-09-11

## 2024-09-11 PROBLEM — F43.21 GRIEF: Status: RESOLVED | Noted: 2024-07-29 | Resolved: 2024-09-11

## 2024-09-11 PROBLEM — Z86.010 HISTORY OF COLON POLYPS: Status: ACTIVE | Noted: 2024-09-11

## 2024-09-11 PROBLEM — N94.9 DISCOMFORT OF VAGINA: Status: RESOLVED | Noted: 2024-07-29 | Resolved: 2024-09-11

## 2024-09-11 PROCEDURE — 45380 COLONOSCOPY AND BIOPSY: CPT | Performed by: INTERNAL MEDICINE

## 2024-09-11 RX ORDER — PROPOFOL 10 MG/ML
INJECTION, EMULSION INTRAVENOUS AS NEEDED
Status: DISCONTINUED | OUTPATIENT
Start: 2024-09-11 | End: 2024-09-11

## 2024-09-11 RX ORDER — SODIUM CHLORIDE 9 MG/ML
20 INJECTION, SOLUTION INTRAVENOUS CONTINUOUS
Status: DISCONTINUED | OUTPATIENT
Start: 2024-09-11 | End: 2024-09-12 | Stop reason: HOSPADM

## 2024-09-11 RX ORDER — LIDOCAINE HYDROCHLORIDE 20 MG/ML
INJECTION, SOLUTION INFILTRATION; PERINEURAL AS NEEDED
Status: DISCONTINUED | OUTPATIENT
Start: 2024-09-11 | End: 2024-09-11

## 2024-09-11 RX ORDER — ONDANSETRON HYDROCHLORIDE 2 MG/ML
4 INJECTION, SOLUTION INTRAVENOUS ONCE AS NEEDED
Status: DISCONTINUED | OUTPATIENT
Start: 2024-09-11 | End: 2024-09-12 | Stop reason: HOSPADM

## 2024-09-11 ASSESSMENT — COLUMBIA-SUICIDE SEVERITY RATING SCALE - C-SSRS
6. HAVE YOU EVER DONE ANYTHING, STARTED TO DO ANYTHING, OR PREPARED TO DO ANYTHING TO END YOUR LIFE?: NO
2. HAVE YOU ACTUALLY HAD ANY THOUGHTS OF KILLING YOURSELF?: NO
1. IN THE PAST MONTH, HAVE YOU WISHED YOU WERE DEAD OR WISHED YOU COULD GO TO SLEEP AND NOT WAKE UP?: NO

## 2024-09-11 ASSESSMENT — PAIN SCALES - GENERAL
PAIN_LEVEL: 0
PAINLEVEL_OUTOF10: 0 - NO PAIN

## 2024-09-11 ASSESSMENT — PAIN - FUNCTIONAL ASSESSMENT
PAIN_FUNCTIONAL_ASSESSMENT: 0-10

## 2024-09-11 NOTE — ANESTHESIA PREPROCEDURE EVALUATION
Patient: Bridgette Diaz    Procedure Information       Anesthesia Start Date/Time: 09/11/24 1324    Scheduled providers: Susan SHEN MD    Procedure: COLONOSCOPY    Location: [facility]            Relevant Problems   Cardiac   (+) Hyperlipidemia, mixed   (+) Primary hypertension      GI   (+) Irritable bowel syndrome      /Renal   (+) Hydronephrosis      Endocrine   (+) Hyperthyroidism   (+) Multiple thyroid nodules      Musculoskeletal   (+) Osteoarthritis of right knee       Clinical information reviewed:   Tobacco  Allergies  Meds   Med Hx  Surg Hx   Fam Hx  Soc Hx        NPO Detail:  NPO/Void Status  Date of Last Liquid: 09/11/24  Time of Last Liquid: 0830  Date of Last Solid: 09/09/24  Time of Last Solid: 1800  Last Intake Type: Clear fluids         Physical Exam    Airway  Mallampati: III  TM distance: <3 FB     Cardiovascular - normal exam     Dental    Pulmonary - normal exam     Abdominal   (+) obese         Anesthesia Plan    History of general anesthesia?: no  History of complications of general anesthesia?: no    ASA 2     MAC     intravenous induction   Anesthetic plan and risks discussed with patient.

## 2024-09-11 NOTE — ANESTHESIA POSTPROCEDURE EVALUATION
Patient: Bridgette Diaz    Procedure Summary       Date: 09/11/24 Room / Location: [facility]    Anesthesia Start: 1324 Anesthesia Stop: 1350    Procedure: COLONOSCOPY Diagnosis: Screening for colon cancer    Scheduled Providers: Susan SHEN MD Responsible Provider: VIKKI Bravo    Anesthesia Type: MAC ASA Status: 2            Anesthesia Type: No value filed.    Vitals Value Taken Time   /70 09/11/24 1346   Temp 37 °C (98.6 °F) 09/11/24 1346   Pulse 72 09/11/24 1346   Resp 10 09/11/24 1346   SpO2 98 % 09/11/24 1346       Anesthesia Post Evaluation    Patient location during evaluation: PACU  Patient participation: waiting for patient participation  Level of consciousness: responsive to verbal stimuli  Pain score: 0  Pain management: adequate  Airway patency: patent  Cardiovascular status: blood pressure returned to baseline  Respiratory status: acceptable  Hydration status: acceptable  Postoperative Nausea and Vomiting: none    No notable events documented.

## 2024-09-11 NOTE — DISCHARGE INSTRUCTIONS
Patient Instructions Post Endoscopy Procedure      The anesthetics, sedatives or narcotics which were given to you today will be acting in your body for the next 24 hours, so you might feel a little sleepy or groggy.  This feeling should slowly wear off. Carefully read and follow the instructions.     You received sedation today:  - Do not drive or operate any machinery or power tools of any kind.   - No alcoholic beverages today, not even beer or wine.  - Do not make any important decisions or sign any legal documents.  - No over the counter medications that contain alcohol or that may cause drowsiness.    While it is common to experience mild to moderate abdominal distention, gas, or belching after your procedure, if any of these symptoms occur following discharge from the GI Lab or within one week of having your procedure, call the [facility] to be advised whether a visit to your nearest Urgent Care or Emergency Department is indicated.  Take this paper with you if you go.   - If you develop an allergic reaction to the medications that were given during your procedure such as difficulty breathing, rash, hives, severe nausea, vomiting or lightheadedness.  - If you experience chest pain, shortness of breath, severe abdominal pain, fevers and chills.  -If you develop signs and symptoms of bleeding such as blood in your spit, if your stools turn black, tarry, or bloody  - If you have not urinated within 8 hours following your procedure.  - If your IV site becomes painful, red, inflamed, or looks infected.      Your physician recommends the additional following instructions:    -You have a contact number available for emergencies. The signs and symptoms of potential delayed complications were discussed with you. You may return to normal activities tomorrow.  -Resume your previous diet or other if specified.  -Continue your present medications.   -We are waiting for your pathology results, if  applicable. The results will be available in [facility]. I will send you a message with any recommendations.  -The findings and recommendations have been discussed with you and/or family.  -Please see Medication Reconciliation Form for new medication/medications prescribed.     If you experience any problems or have any questions following discharge from the GI Lab, please call: [phone] from 7 am- 4:30 pm.  In the event of an emergency please go to the closest Emergency Department or call Dr. Eisenberg at [phone]

## 2024-09-11 NOTE — H&P
Outpatient Hospital Procedure    Patient Profile-Procedures  Initial Info  Patient Demographics  Name Bridgette Diaz  Date of Birth 1950  MRN [id number]  Address   [address] [id number] [facility] [address]    Primary Phone Number [phone]  Secondary Phone Number    Zara Shah    Procedures   Colonoscopy      Indication:  screening - possible remote polyps - last colonoscopy 10 years ago    Primary contact name and number   Extended Emergency Contact Information  Primary Emergency Contact: Sanjuanita Diaz  Address: [address] [place] [address] [facility] [place]  Mobile Phone: [phone]  Relation: Daughter  Secondary Emergency Contact: Nayeli Castro  Address: [address] [place] [address] [place] [address]  Mobile Phone: [phone]  Relation: Grandchild    General Health  Weight   Vitals:    09/11/24 1254   Weight: 78.9 kg (174 lb)     BMI Body mass index is 30.82 kg/m².    Allergies  Allergies   Allergen Reactions    Chocolate Other     Causes congestion.       Past Medical History   Past Medical History:   Diagnosis Date    H/O bone density study     9/12/22: Lowest T score -1.6 10-Year Fracture Risk: Major Osteoporotic Fracture 17.9% Hip Fracture                7.5% 3/18: -2.1 FRAX 18, 3.9% 8/6/20: T score -1.8.  FRAX scores:  Major Osteoporotic Fracture: 17.8%                                                            Hip Fracture:                5.6%    H/O CT scan     8/20: CT calcium score=0 Linear subpleural atelectasis is identified within the lingula and lower lobes 5/21 CT Urogram: 1.  Opacified portions of bilateral ureters appear unremarkable. 2. No significant hydronephrosis. 3. Left parapelvic cysts with additional tiny subcentimeter bilateral renal probable cysts 4. Mild bilateral pelviectasis. 5. Extensive colonic diverticulosis    H/O diagnostic ultrasound     US THyroid 4/20: Stable multinodular goiter since  December 2018. No suspicious nodules are demonstrated 12/18: RIGHT LOBE:predominantly solid nodule in the lower pole of the right upper lobe. LEFT LOBE:A mixed solid and cystic nodule in the mid left thyroid lobe  2 x 1.1 x 1.4 cm. Another mixed solid cystic nodule left thyroid lobe 1.8 x 1.3 x 1.1 cm. A. solid nodule isthmus 2 x 1.7 x 1.8 cm.    H/O diagnostic ultrasound     US thyroid: 4/16/22:  RIGHT LOBE: spongiform nodule which measures 2.1 x 1.6 x 2.4 cm. It is essentially unchanged. LEFT LOBE: 2 spongiform nodules which measure 1.9 x 1.2 x 1.5 cm and 1.8 x 1.0 x 1.3 cm and are centrally unchanged. A 3rd spongiform nodule is seen in the inferior pole the left lobe of the thyroid which measures 1.4 x 0.9 x 1.3 cm. It has decreased in size    H/O diagnostic ultrasound     US kidney 4/21: Mild nonspecific apparent left-sided hydronephrosis. Small parapelvic left renal cyst, 12mm. Otherwise unremarkable exam US bldder 2012 (-) US abd 12/18: normal US kidney 9/12/22: Parapelvic midpole cyst measures 16 x 14 x 13 mm today, compared to 9 x 12 x 13 mm previously, mildly larger    H/O diagnostic ultrasound 09/16/2023    US kidney: 1.  No significant sonographic abnormality in either kidney. 2. Stable benign 1.5 cm left renal sinus cyst    H/O echocardiogram     1/18: There is mild upper septal left  ventricular hypertrophy. Left ventricular systolic function is normal. EF = 69   5% (2D biplane) Normal left ventricular diastolic function.  - The right ventricle is normal in size. Right ventricular systolic function is  normal.  - There are no significant valvular abnormalities.  - RVSP is 19 mmHg c/w normal PA pressure. Estimated RApressure is 0 mmHg    H/O x-ray of lower extremity 05/07/2024    XR Knee 11/23: AP lateral right knee x-ray shows advanced end-stage arthrosis bone-on-bone arthrosis varus deformity osteophyte formation throughout all 3 compartments.  On the lateral view there is severe patellofemoral  arthrosis.  No fracture dislocation noted       Provider assessment  Diagnosis  Medication Reviewed - yes  Prior to Admission medications    Medication Sig Start Date End Date Taking? Authorizing Provider   ascorbic acid (Vitamin C) 1,000 mg tablet Take 1 tablet (1,000 mg) by mouth once daily. 4/13/20  Yes Historical Provider, MD   calcium elizabeth,cit-D3-[id number] 300-200-13.5 mg-unit-mg tablet Take 1 tablet by mouth once daily.   Yes Historical Provider, MD   cholecalciferol (Vitamin D-3) 125 MCG (5000 UT) capsule Take 1 capsule (125 mcg) by mouth once daily. 8/13/19  Yes Historical Provider, MD   fish oil concentrate (Fish OiL) 120-180 mg capsule Take 3 capsules (3 g) by mouth once daily.   Yes Historical Provider, MD   melatonin 3 mg tablet,disintegrating Take 1 tablet (3 mg) by mouth if needed.   Yes Historical Provider, MD   methIMAzole (Tapazole) 5 mg tablet Take 0.5 tablets (2.5 mg) by mouth once daily.   Yes Historical Provider, MD   QUERCETIN ORAL Take 1 tablet by mouth if needed.   Yes Historical Provider, MD   zinc sulfate (Zincate) 220 (50 Zn) MG capsule Take 1 capsule (50 mg of elemental zinc) by mouth once daily.   Yes Historical Provider, MD   hydrocortisone 1 % cream Apply 1 Application topically if needed. 9/17/22   Historical Provider, MD       This is my H&P    Physical Exam  Physical Exam  Constitutional:       Comments: Awake   HENT:      Head: Normocephalic.   Cardiovascular:      Rate and Rhythm: Normal rate and regular rhythm.   Pulmonary:      Effort: Pulmonary effort is normal.      Breath sounds: Normal breath sounds.   Abdominal:      General: Bowel sounds are normal.      Palpations: Abdomen is soft.   Neurological:      Mental Status: She is alert.   Psychiatric:         Mood and Affect: Mood normal.           Oropharyngeal Classification III (soft and hard palate and base of uvula visible)  ASA PS Classification 2  Sedation Plan Deep  Procedure Plan - pre-procedural (re)assesment  completed by physician:  discharge/transfer patient when discharge criteria met    Susan Eisenberg MD  9/11/2024 1:23 PM

## 2024-09-16 ENCOUNTER — HOSPITAL ENCOUNTER (OUTPATIENT)
Dept: RADIOLOGY | Facility: CLINIC | Age: 74
Discharge: HOME | End: 2024-09-16
Payer: MEDICARE

## 2024-09-16 DIAGNOSIS — Z12.31 ENCOUNTER FOR SCREENING MAMMOGRAM FOR BREAST CANCER: ICD-10-CM

## 2024-09-16 DIAGNOSIS — Z78.0 MENOPAUSE: ICD-10-CM

## 2024-09-16 PROCEDURE — 77067 SCR MAMMO BI INCL CAD: CPT | Performed by: RADIOLOGY

## 2024-09-16 PROCEDURE — 77080 DXA BONE DENSITY AXIAL: CPT | Performed by: RADIOLOGY

## 2024-09-16 PROCEDURE — 77063 BREAST TOMOSYNTHESIS BI: CPT | Performed by: RADIOLOGY

## 2024-09-16 PROCEDURE — 77080 DXA BONE DENSITY AXIAL: CPT

## 2024-09-16 PROCEDURE — 77067 SCR MAMMO BI INCL CAD: CPT

## 2024-09-19 LAB
LABORATORY COMMENT REPORT: NORMAL
PATH REPORT.FINAL DX SPEC: NORMAL
PATH REPORT.GROSS SPEC: NORMAL
PATH REPORT.TOTAL CANCER: NORMAL

## 2024-11-11 ENCOUNTER — LAB (OUTPATIENT)
Dept: LAB | Facility: LAB | Age: 74
End: 2024-11-11
Payer: MEDICARE

## 2024-11-11 ENCOUNTER — APPOINTMENT (OUTPATIENT)
Dept: PRIMARY CARE | Facility: CLINIC | Age: 74
End: 2024-11-11
Payer: MEDICARE

## 2024-11-11 VITALS
TEMPERATURE: 96.6 F | HEIGHT: 63 IN | BODY MASS INDEX: 30.83 KG/M2 | DIASTOLIC BLOOD PRESSURE: 74 MMHG | WEIGHT: 174 LBS | OXYGEN SATURATION: 95 % | SYSTOLIC BLOOD PRESSURE: 142 MMHG | HEART RATE: 79 BPM

## 2024-11-11 DIAGNOSIS — Z86.0100 HISTORY OF COLON POLYPS: ICD-10-CM

## 2024-11-11 DIAGNOSIS — R73.01 IFG (IMPAIRED FASTING GLUCOSE): ICD-10-CM

## 2024-11-11 DIAGNOSIS — I10 PRIMARY HYPERTENSION: ICD-10-CM

## 2024-11-11 DIAGNOSIS — L25.9 CONTACT DERMATITIS, UNSPECIFIED CONTACT DERMATITIS TYPE, UNSPECIFIED TRIGGER: ICD-10-CM

## 2024-11-11 DIAGNOSIS — E04.2 MULTIPLE THYROID NODULES: ICD-10-CM

## 2024-11-11 DIAGNOSIS — L23.7 POISON IVY DERMATITIS: ICD-10-CM

## 2024-11-11 DIAGNOSIS — E05.90 HYPERTHYROIDISM: Primary | ICD-10-CM

## 2024-11-11 DIAGNOSIS — N18.31 STAGE 3A CHRONIC KIDNEY DISEASE (MULTI): ICD-10-CM

## 2024-11-11 DIAGNOSIS — L98.9 CHANGING SKIN LESION: ICD-10-CM

## 2024-11-11 DIAGNOSIS — E05.90 HYPERTHYROIDISM: ICD-10-CM

## 2024-11-11 PROBLEM — N28.9 ABNORMAL KIDNEY FUNCTION: Status: RESOLVED | Noted: 2024-07-29 | Resolved: 2024-11-11

## 2024-11-11 PROBLEM — M25.561 RIGHT KNEE PAIN: Status: RESOLVED | Noted: 2024-07-29 | Resolved: 2024-11-11

## 2024-11-11 PROBLEM — R93.2 ABNORMAL FINDINGS ON IMAGING OF BILIARY TRACT: Status: RESOLVED | Noted: 2023-03-28 | Resolved: 2024-11-11

## 2024-11-11 LAB
ALBUMIN SERPL BCP-MCNC: 4 G/DL (ref 3.4–5)
ALP SERPL-CCNC: 75 U/L (ref 33–136)
ALT SERPL W P-5'-P-CCNC: 12 U/L (ref 7–45)
ANION GAP SERPL CALC-SCNC: 12 MMOL/L (ref 10–20)
AST SERPL W P-5'-P-CCNC: 14 U/L (ref 9–39)
BASOPHILS # BLD AUTO: 0.04 X10*3/UL (ref 0–0.1)
BASOPHILS NFR BLD AUTO: 0.5 %
BILIRUB SERPL-MCNC: 0.6 MG/DL (ref 0–1.2)
BUN SERPL-MCNC: 21 MG/DL (ref 6–23)
CALCIUM SERPL-MCNC: 8.7 MG/DL (ref 8.6–10.3)
CHLORIDE SERPL-SCNC: 103 MMOL/L (ref 98–107)
CO2 SERPL-SCNC: 30 MMOL/L (ref 21–32)
CREAT SERPL-MCNC: 0.98 MG/DL (ref 0.5–1.05)
EGFRCR SERPLBLD CKD-EPI 2021: 60 ML/MIN/1.73M*2
EOSINOPHIL # BLD AUTO: 0.29 X10*3/UL (ref 0–0.4)
EOSINOPHIL NFR BLD AUTO: 3.6 %
ERYTHROCYTE [DISTWIDTH] IN BLOOD BY AUTOMATED COUNT: 12.9 % (ref 11.5–14.5)
EST. AVERAGE GLUCOSE BLD GHB EST-MCNC: 126 MG/DL
GLUCOSE SERPL-MCNC: 97 MG/DL (ref 74–99)
HBA1C MFR BLD: 6 %
HCT VFR BLD AUTO: 39.1 % (ref 36–46)
HGB BLD-MCNC: 12.6 G/DL (ref 12–16)
IMM GRANULOCYTES # BLD AUTO: 0.02 X10*3/UL (ref 0–0.5)
IMM GRANULOCYTES NFR BLD AUTO: 0.2 % (ref 0–0.9)
LYMPHOCYTES # BLD AUTO: 1.98 X10*3/UL (ref 0.8–3)
LYMPHOCYTES NFR BLD AUTO: 24.7 %
MCH RBC QN AUTO: 30.8 PG (ref 26–34)
MCHC RBC AUTO-ENTMCNC: 32.2 G/DL (ref 32–36)
MCV RBC AUTO: 96 FL (ref 80–100)
MONOCYTES # BLD AUTO: 0.65 X10*3/UL (ref 0.05–0.8)
MONOCYTES NFR BLD AUTO: 8.1 %
NEUTROPHILS # BLD AUTO: 5.03 X10*3/UL (ref 1.6–5.5)
NEUTROPHILS NFR BLD AUTO: 62.9 %
NRBC BLD-RTO: 0 /100 WBCS (ref 0–0)
PLATELET # BLD AUTO: 308 X10*3/UL (ref 150–450)
POTASSIUM SERPL-SCNC: 4.2 MMOL/L (ref 3.5–5.3)
PROT SERPL-MCNC: 7 G/DL (ref 6.4–8.2)
RBC # BLD AUTO: 4.09 X10*6/UL (ref 4–5.2)
SODIUM SERPL-SCNC: 141 MMOL/L (ref 136–145)
T4 FREE SERPL-MCNC: 0.89 NG/DL (ref 0.61–1.12)
TSH SERPL-ACNC: 2.49 MIU/L (ref 0.44–3.98)
WBC # BLD AUTO: 8 X10*3/UL (ref 4.4–11.3)

## 2024-11-11 PROCEDURE — 99214 OFFICE O/P EST MOD 30 MIN: CPT | Performed by: INTERNAL MEDICINE

## 2024-11-11 PROCEDURE — 1159F MED LIST DOCD IN RCRD: CPT | Performed by: INTERNAL MEDICINE

## 2024-11-11 PROCEDURE — 1160F RVW MEDS BY RX/DR IN RCRD: CPT | Performed by: INTERNAL MEDICINE

## 2024-11-11 PROCEDURE — 36415 COLL VENOUS BLD VENIPUNCTURE: CPT

## 2024-11-11 PROCEDURE — 1036F TOBACCO NON-USER: CPT | Performed by: INTERNAL MEDICINE

## 2024-11-11 PROCEDURE — 80053 COMPREHEN METABOLIC PANEL: CPT

## 2024-11-11 PROCEDURE — 85025 COMPLETE CBC W/AUTO DIFF WBC: CPT

## 2024-11-11 PROCEDURE — 83036 HEMOGLOBIN GLYCOSYLATED A1C: CPT

## 2024-11-11 PROCEDURE — 3078F DIAST BP <80 MM HG: CPT | Performed by: INTERNAL MEDICINE

## 2024-11-11 PROCEDURE — 3077F SYST BP >= 140 MM HG: CPT | Performed by: INTERNAL MEDICINE

## 2024-11-11 PROCEDURE — 84481 FREE ASSAY (FT-3): CPT

## 2024-11-11 PROCEDURE — G2211 COMPLEX E/M VISIT ADD ON: HCPCS | Performed by: INTERNAL MEDICINE

## 2024-11-11 PROCEDURE — 84439 ASSAY OF FREE THYROXINE: CPT

## 2024-11-11 PROCEDURE — 84443 ASSAY THYROID STIM HORMONE: CPT

## 2024-11-11 PROCEDURE — 1123F ACP DISCUSS/DSCN MKR DOCD: CPT | Performed by: INTERNAL MEDICINE

## 2024-11-11 RX ORDER — PREDNISONE 20 MG/1
20 TABLET ORAL 2 TIMES DAILY
Qty: 10 TABLET | Refills: 3 | Status: SHIPPED | OUTPATIENT
Start: 2024-11-11
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
## 2024-11-12 LAB — T3FREE SERPL-MCNC: 3.5 PG/ML (ref 2.3–4.2)

## 2024-11-22 PROBLEM — L82.1 SEBORRHEIC KERATOSES: Status: ACTIVE | Noted: 2024-11-22

## 2024-11-25 ENCOUNTER — APPOINTMENT (OUTPATIENT)
Dept: PRIMARY CARE | Facility: CLINIC | Age: 74
End: 2024-11-25
Payer: MEDICARE

## 2024-11-25 VITALS
TEMPERATURE: 98.5 F | BODY MASS INDEX: 31.71 KG/M2 | HEIGHT: 63 IN | SYSTOLIC BLOOD PRESSURE: 138 MMHG | OXYGEN SATURATION: 94 % | WEIGHT: 179 LBS | DIASTOLIC BLOOD PRESSURE: 83 MMHG | HEART RATE: 76 BPM

## 2024-11-25 DIAGNOSIS — I10 PRIMARY HYPERTENSION: Primary | ICD-10-CM

## 2024-11-25 PROCEDURE — 3079F DIAST BP 80-89 MM HG: CPT | Performed by: NURSE PRACTITIONER

## 2024-11-25 PROCEDURE — 1159F MED LIST DOCD IN RCRD: CPT | Performed by: NURSE PRACTITIONER

## 2024-11-25 PROCEDURE — 3075F SYST BP GE 130 - 139MM HG: CPT | Performed by: NURSE PRACTITIONER

## 2024-11-25 PROCEDURE — 1160F RVW MEDS BY RX/DR IN RCRD: CPT | Performed by: NURSE PRACTITIONER

## 2024-11-25 PROCEDURE — 99213 OFFICE O/P EST LOW 20 MIN: CPT | Performed by: NURSE PRACTITIONER

## 2024-11-25 PROCEDURE — 1036F TOBACCO NON-USER: CPT | Performed by: NURSE PRACTITIONER

## 2024-11-25 PROCEDURE — G2211 COMPLEX E/M VISIT ADD ON: HCPCS | Performed by: NURSE PRACTITIONER

## 2024-11-25 PROCEDURE — 1123F ACP DISCUSS/DSCN MKR DOCD: CPT | Performed by: NURSE PRACTITIONER

## 2024-11-25 RX ORDER — CETIRIZINE HYDROCHLORIDE 10 MG/1
10 TABLET ORAL
COMMUNITY

## 2024-11-25 RX ORDER — FLUTICASONE PROPIONATE 50 MCG
1 SPRAY, SUSPENSION (ML) NASAL
COMMUNITY

## 2024-11-25 ASSESSMENT — PATIENT HEALTH QUESTIONNAIRE - PHQ9: 1. LITTLE INTEREST OR PLEASURE IN DOING THINGS: NOT AT ALL

## 2024-11-25 NOTE — PROGRESS NOTES
"Problem List Items Addressed This Visit          Medium    Primary hypertension - Primary    Overview     11/25/24:  Office cuff 138/83 HR 76  Home cuff 148/84 HR 79         Current Assessment & Plan     Patient would like to check BP for a few more weeks (just started new juice regimen said to lower BP) prior to starting antihypertensive  If she finds she is consistently running elevated at home, she will call  - ok to start med  Will see her back either way in a month              Subjective   Patient ID: Bridgette Diaz is a 74 y.o. female who presents for Blood Pressure Check (BP check/Pt brought in home cuff/Office cuff  138/83 hr 76/Home cuff  148/84 hr 79).  HPI  Elevated BP fu  Office cuff 138/83 HR 76  Home cuff 148/84 HR 79  Home readings reviewed      Occ caff tea  Not much canned or prepared  Drinks water throughout the day    Review of Systems   All other systems reviewed and are negative.      BP Readings from Last 3 Encounters:   11/25/24 138/83   11/11/24 142/74   09/11/24 144/87      Wt Readings from Last 3 Encounters:   11/25/24 81.2 kg (179 lb)   11/11/24 78.9 kg (174 lb)   09/11/24 78.9 kg (174 lb)      BMI:   Estimated body mass index is 31.71 kg/m² as calculated from the following:    Height as of this encounter: 1.6 m (5' 3\").    Weight as of this encounter: 81.2 kg (179 lb).    Objective   Physical Exam  Constitutional:       General: She is not in acute distress.  HENT:      Head: Normocephalic and atraumatic.      Nose: Nose normal.      Mouth/Throat:      Mouth: Mucous membranes are moist.   Eyes:      Extraocular Movements: Extraocular movements intact.      Conjunctiva/sclera: Conjunctivae normal.   Pulmonary:      Effort: Pulmonary effort is normal.   Musculoskeletal:         General: Normal range of motion.      Cervical back: Normal range of motion and neck supple.   Skin:     General: Skin is warm and dry.   Neurological:      General: No focal deficit present.      Mental Status: " She is alert.   Psychiatric:         Mood and Affect: Mood normal.

## 2024-11-25 NOTE — ASSESSMENT & PLAN NOTE
Patient would like to check BP for a few more weeks (just started new juice regimen said to lower BP) prior to starting antihypertensive  If she finds she is consistently running elevated at home, she will call  - ok to start med  Will see her back either way in a month

## 2024-12-26 ENCOUNTER — TELEPHONE (OUTPATIENT)
Dept: PRIMARY CARE | Facility: CLINIC | Age: 74
End: 2024-12-26
Payer: MEDICARE

## 2024-12-26 ENCOUNTER — DOCUMENTATION (OUTPATIENT)
Dept: PRIMARY CARE | Facility: CLINIC | Age: 74
End: 2024-12-26
Payer: MEDICARE

## 2024-12-26 VITALS — SYSTOLIC BLOOD PRESSURE: 118 MMHG | DIASTOLIC BLOOD PRESSURE: 69 MMHG

## 2024-12-26 NOTE — TELEPHONE ENCOUNTER
Patient called with BP readings   12/23 - 133/68  12/24 - 124/66  12/25 - 118/69    Her BP was 10 off from our cuff she has been good she also cancelled upcoming appt for the 30th due to weather   If anything changes she will give us a call

## 2024-12-30 ENCOUNTER — APPOINTMENT (OUTPATIENT)
Dept: PRIMARY CARE | Facility: CLINIC | Age: 74
End: 2024-12-30
Payer: MEDICARE

## 2025-05-13 ENCOUNTER — OFFICE VISIT (OUTPATIENT)
Dept: PRIMARY CARE | Facility: CLINIC | Age: 75
End: 2025-05-13
Payer: MEDICARE

## 2025-05-13 ENCOUNTER — APPOINTMENT (OUTPATIENT)
Dept: PRIMARY CARE | Facility: CLINIC | Age: 75
End: 2025-05-13
Payer: MEDICARE

## 2025-05-13 VITALS
TEMPERATURE: 98.6 F | HEIGHT: 63 IN | DIASTOLIC BLOOD PRESSURE: 76 MMHG | SYSTOLIC BLOOD PRESSURE: 125 MMHG | WEIGHT: 183 LBS | BODY MASS INDEX: 32.43 KG/M2 | OXYGEN SATURATION: 94 %

## 2025-05-13 DIAGNOSIS — Z71.89 ADVANCED DIRECTIVES, COUNSELING/DISCUSSION: ICD-10-CM

## 2025-05-13 DIAGNOSIS — Z00.00 ROUTINE ADULT HEALTH MAINTENANCE: Primary | ICD-10-CM

## 2025-05-13 DIAGNOSIS — E55.9 VITAMIN D DEFICIENCY: ICD-10-CM

## 2025-05-13 DIAGNOSIS — E04.2 MULTIPLE THYROID NODULES: ICD-10-CM

## 2025-05-13 DIAGNOSIS — Z13.9 ENCOUNTER FOR SCREENING INVOLVING SOCIAL DETERMINANTS OF HEALTH (SDOH): ICD-10-CM

## 2025-05-13 DIAGNOSIS — L23.7 POISON IVY DERMATITIS: ICD-10-CM

## 2025-05-13 DIAGNOSIS — E05.90 HYPERTHYROIDISM: ICD-10-CM

## 2025-05-13 DIAGNOSIS — M85.9 DISORDER OF BONE DENSITY AND STRUCTURE, UNSPECIFIED: ICD-10-CM

## 2025-05-13 DIAGNOSIS — Z12.31 ENCOUNTER FOR SCREENING MAMMOGRAM FOR BREAST CANCER: ICD-10-CM

## 2025-05-13 DIAGNOSIS — Z71.89 CARDIAC RISK COUNSELING: ICD-10-CM

## 2025-05-13 DIAGNOSIS — N18.31 STAGE 3A CHRONIC KIDNEY DISEASE (MULTI): ICD-10-CM

## 2025-05-13 DIAGNOSIS — N13.30 HYDRONEPHROSIS, UNSPECIFIED HYDRONEPHROSIS TYPE: ICD-10-CM

## 2025-05-13 DIAGNOSIS — I10 PRIMARY HYPERTENSION: ICD-10-CM

## 2025-05-13 DIAGNOSIS — N28.1 ACQUIRED RENAL CYST: ICD-10-CM

## 2025-05-13 PROBLEM — Z13.39 ALCOHOL SCREENING: Status: ACTIVE | Noted: 2025-05-13

## 2025-05-13 PROBLEM — L82.1 SEBORRHEIC KERATOSES: Status: RESOLVED | Noted: 2024-11-22 | Resolved: 2025-05-13

## 2025-05-13 PROCEDURE — 1123F ACP DISCUSS/DSCN MKR DOCD: CPT | Performed by: INTERNAL MEDICINE

## 2025-05-13 PROCEDURE — G0439 PPPS, SUBSEQ VISIT: HCPCS | Performed by: INTERNAL MEDICINE

## 2025-05-13 PROCEDURE — 1160F RVW MEDS BY RX/DR IN RCRD: CPT | Performed by: INTERNAL MEDICINE

## 2025-05-13 PROCEDURE — 3078F DIAST BP <80 MM HG: CPT | Performed by: INTERNAL MEDICINE

## 2025-05-13 PROCEDURE — 99214 OFFICE O/P EST MOD 30 MIN: CPT | Performed by: INTERNAL MEDICINE

## 2025-05-13 PROCEDURE — G0446 INTENS BEHAVE THER CARDIO DX: HCPCS | Performed by: INTERNAL MEDICINE

## 2025-05-13 PROCEDURE — 1170F FXNL STATUS ASSESSED: CPT | Performed by: INTERNAL MEDICINE

## 2025-05-13 PROCEDURE — 1126F AMNT PAIN NOTED NONE PRSNT: CPT | Performed by: INTERNAL MEDICINE

## 2025-05-13 PROCEDURE — 99497 ADVNCD CARE PLAN 30 MIN: CPT | Performed by: INTERNAL MEDICINE

## 2025-05-13 PROCEDURE — G0136 PR ADMINISTRATION OF A STANDARDIZED, EVIDENCE-BASED SOCIAL DETERMINANTS OF HEALTH RISK ASSESSMENT TOOL, 5 TO 15 MINUTES: HCPCS | Performed by: INTERNAL MEDICINE

## 2025-05-13 PROCEDURE — 1159F MED LIST DOCD IN RCRD: CPT | Performed by: INTERNAL MEDICINE

## 2025-05-13 PROCEDURE — 1036F TOBACCO NON-USER: CPT | Performed by: INTERNAL MEDICINE

## 2025-05-13 PROCEDURE — G2211 COMPLEX E/M VISIT ADD ON: HCPCS | Performed by: INTERNAL MEDICINE

## 2025-05-13 PROCEDURE — 3074F SYST BP LT 130 MM HG: CPT | Performed by: INTERNAL MEDICINE

## 2025-05-13 RX ORDER — HYDROCORTISONE 1 %
1 CREAM (GRAM) TOPICAL AS NEEDED
Qty: 56 G | Refills: 3 | Status: SHIPPED | OUTPATIENT
Start: 2025-05-13

## 2025-05-13 RX ORDER — METHIMAZOLE 5 MG/1
2.5 TABLET ORAL DAILY
Qty: 45 TABLET | Refills: 3 | Status: SHIPPED | OUTPATIENT
Start: 2025-05-13

## 2025-05-13 RX ORDER — PREDNISONE 20 MG/1
20 TABLET ORAL 2 TIMES DAILY
Qty: 10 TABLET | Refills: 3 | Status: SHIPPED | OUTPATIENT
Start: 2025-05-13

## 2025-05-13 SDOH — ECONOMIC STABILITY: FOOD INSECURITY: WITHIN THE PAST 12 MONTHS, YOU WORRIED THAT YOUR FOOD WOULD RUN OUT BEFORE YOU GOT MONEY TO BUY MORE.: NEVER TRUE

## 2025-05-13 SDOH — ECONOMIC STABILITY: FOOD INSECURITY: WITHIN THE PAST 12 MONTHS, THE FOOD YOU BOUGHT JUST DIDN'T LAST AND YOU DIDN'T HAVE MONEY TO GET MORE.: NEVER TRUE

## 2025-05-13 SDOH — ECONOMIC STABILITY: TRANSPORTATION INSECURITY
IN THE PAST 12 MONTHS, HAS THE LACK OF TRANSPORTATION KEPT YOU FROM MEDICAL APPOINTMENTS OR FROM GETTING MEDICATIONS?: NO

## 2025-05-13 SDOH — ECONOMIC STABILITY: INCOME INSECURITY: IN THE LAST 12 MONTHS, WAS THERE A TIME WHEN YOU WERE NOT ABLE TO PAY THE MORTGAGE OR RENT ON TIME?: NO

## 2025-05-13 SDOH — ECONOMIC STABILITY: TRANSPORTATION INSECURITY
IN THE PAST 12 MONTHS, HAS LACK OF TRANSPORTATION KEPT YOU FROM MEETINGS, WORK, OR FROM GETTING THINGS NEEDED FOR DAILY LIVING?: NO

## 2025-05-13 ASSESSMENT — PAIN SCALES - GENERAL: PAINLEVEL_OUTOF10: 0-NO PAIN

## 2025-05-13 ASSESSMENT — SOCIAL DETERMINANTS OF HEALTH (SDOH): IN THE PAST 12 MONTHS, HAS THE ELECTRIC, GAS, OIL, OR WATER COMPANY THREATENED TO SHUT OFF SERVICE IN YOUR HOME?: NO

## 2025-05-13 ASSESSMENT — PATIENT HEALTH QUESTIONNAIRE - PHQ9
SUM OF ALL RESPONSES TO PHQ9 QUESTIONS 1 AND 2: 0
2. FEELING DOWN, DEPRESSED OR HOPELESS: NOT AT ALL
1. LITTLE INTEREST OR PLEASURE IN DOING THINGS: NOT AT ALL

## 2025-05-13 NOTE — ASSESSMENT & PLAN NOTE
Annual Wellness exam completed   Preventive Health History reviewed  Ordered:   Labs    Mammogram

## 2025-05-13 NOTE — PROGRESS NOTES
Annual Medicare Wellness Exam/Comprehensive Problem Focused Follow Up  HPI/CC  Chief Complaint   Patient presents with    Medicare Annual Wellness Visit Subsequent     Reviewed chart for care received since last appointment.    US thyroid 7/24:  NODULE 1:   Location: Inferior right thyroid lobe   Size: 2 1 7 x 2.5 x 2.3 cm   Characteristics:       Composition:  Solid or almost completely solid, 2 points        Echogenicity: Hypoechoic, 2 points        Shape:  Wider-than-tall, 0 points        Margin: Smooth, 0 points        Echogenic foci (add points for all that apply):  None, 0 points        Interval growth:  Stable   TI-RADS Category: TR4   ACR Recommendation: TI-RADS 4 nodule.   FNA is recommended.     NODULE 2:   Location: Inferior right thyroid lobe   Size: 3.5 x 2.9 x 2.0 cm   Characteristics:       Composition:  Solid or almost completely solid, 2 points        Echogenicity: Hypoechoic, 2 points        Shape:  Wider-than-tall, 0 points        Margin: Smooth, 0 points        Echogenic foci (add points for all that apply):  None, 0 points        Internal vascularity:  absent        Interval growth:  Stable   TI-RADS Category: TR4   ACR Recommendation: TI-RADS 4 nodule.   FNA is recommended.     NODULE 3:   Location: Left mid   Size: 2.2 x 1.9 x 1.5 cm   Characteristics:       Composition:  Solid or almost completely solid, 2 points        Echogenicity: Hypoechoic, 2 points        Shape:  Wider-than-tall, 0 points        Margin: Smooth, 0 points        Echogenic foci (add points for all that apply):  None, 0 points        Internal vascularity:  absent        Interval growth:  Stable   TI-RADS Category: TR4   ACR Recommendation: TI-RADS 4 nodule.   FNA is recommended.     NODULE 4:   Location: Left thyroid isthmus   Size: 1.8 x 1.2 x 1.2 cm   Characteristics:       Composition:  Solid or almost completely solid, 2 points        Echogenicity: Hypoechoic, 2 points        Shape:  Wider-than-tall, 0 points         Margin: Smooth, 0 points        Echogenic foci (add points for all that apply):  None, 0 points        Internal vascularity:  absent        Interval growth:  Not clearly visible on prior   TI-RADS Category: TR4   ACR Recommendation: TI-RADS 4 nodule.   FNA is recommended.     Sees Endo and no FNA was done    11/25/24:  Office cuff 138/83 HR 76  Home cuff 148/84 HR 79  BP at home well controlled      Assessment and Plan:  Problem List Items Addressed This Visit          High    Routine adult health maintenance - Primary    Overview   Adacel 10/06, Declined Tetanus vaccine  Declined COVID, Influenza, Shingles or Pneumonia vaccine  Cscope 2014 (10yrs); 9/11/24 (TAs but no further needed)  Mamm 2018; 8/3/20; 8/6/21, 9/12/22; 9/23, 9/16/24  BMD 2018; 8/6/20, 9/12/22, 9/16/24  S/p supracervical hysterectomy and BSO  4/21: Current 10-Year ASCVD Risk:  10.63% - Intermediate Risk  11/25/24:  Office cuff 138/83 HR 76  Home cuff 148/84 HR 79         Current Assessment & Plan   Annual Wellness exam completed   Preventive Health History reviewed  Ordered:   Labs    Mammogram                 Medium    Acquired renal cyst    Overview   5/21 CT Urogram: 1.  Opacified portions of bilateral ureters appear unremarkable.  2. No significant hydronephrosis.  3. Left parapelvic cysts with additional tiny subcentimeter bilateral renal hypodense lesions which are too small to characterize see CT, however statistically likely represent cysts.  4. Mild bilateral pelviectasis.  US 9/12/22: Parapelvic midpole cyst measures 16 x 14 x 13 mm today, compared to 9 x 12 x 13 mm previously, mildly larger  US 9/23: 1.  No significant sonographic abnormality in either kidney.  2. Stable benign 1.5 cm left renal sinus cyst         Relevant Orders    US renal complete    Advanced directives, counseling/discussion    Overview   5/13/25: HCPOA: Sanjuanita Diaz (Daughter), Son (Kavon/Dayo Diaz), Nayeli Castro (granddaughter)  FULL CODE         BMI  32.0-32.9,adult    Current Assessment & Plan   Will discuss options for weight loss  at follow up  Could consider SGLT2 or GLP1 given CKD         Cardiac risk counseling    Overview   5/13/25: Current 10-Year ASCVD Risk:  15.6% - Intermediate Risk    ASA not rec'd based on current guidelines         CKD (chronic kidney disease) stage 3, GFR 30-59 ml/min (Multi)    Overview   Avoid NSAIDs  Hydrate well daily 64-80 oz daily         Relevant Orders    Urinalysis with Reflex Microscopic    Magnesium    US renal complete    Albumin-Creatinine Ratio, Urine Random    TSH with reflex to Free T4 if abnormal    Comprehensive Metabolic Panel    CBC and Auto Differential    Lipid Panel    Urinalysis with Reflex Microscopic    Albumin-Creatinine Ratio, Urine Random    Disorder of bone density and structure, unspecified    Overview   High FRAX scores on BMD:  3/18: -2.1 FRAX 18, 3.9%  8/6/20: T score -1.8.    FRAX scores:  Major Osteoporotic Fracture: 17.8%                         Hip Fracture:                5.6%  9/24 BMD. Lowest T score -1.8  Lowest T score -1.8..  10-year Fracture Risk:   Major Osteoporotic Fracture 21.4%   Hip Fracture 11.9%   Son is chriopractor and will do supplements through him per her decision         Encounter for screening involving social determinants of health (SDoH)    Overview   05/13/25: 5 minutes spent on SDOH screening.  Specifically: Housing, Food Insecurity, Utilities and Transportatin Needs were evaluated   (See Screenings in Rooming section for documentation)           Encounter for screening mammogram for breast cancer    Relevant Orders    BI mammo bilateral screening tomosynthesis    Hydronephrosis    Overview   Comment on above: 5/21 CT Urogram: IMPRESSION:1. Opacified portions of bilateral ureters appear unremarkable.2. No significant hydronephrosis.3. Left parapelvic cysts with additional tiny subcentimeter bilateralrenal hypodense lesions which are too small to characterize see  "CT,however statistically likely represent cysts.4. Mild bilateral pelviectasis.;         Current Assessment & Plan   US ro assess         Relevant Orders    US renal complete    Hyperthyroidism    Overview   s/p ENDO consult 8/22: She is clinically euthyroid but thyroid indices have increased substantially since April. This may reflect decreased suppression from iodine, although dose of liquid iodine she describes is quite low. It is quite possible that the activity of her Graves' disease, assuming this is the cause, has increased. This should be reflected in her thyroid-stimulating immunoglobulin result.   Started Tapazole in 2022  Managed by Endo [facility] (Dr Sanchez)         Relevant Medications    methIMAzole (Tapazole) 5 mg tablet    Other Relevant Orders    Comprehensive Metabolic Panel    CBC and Auto Differential    Lipid Panel    TSH with reflex to Free T4 if abnormal    Multiple thyroid nodules    Overview   US 4/16/22:  RIGHT LOBE: spongiform nodule which measures 2.1 x 1.6 x 2.4 cm. It is essentially unchanged.  LEFT LOBE: 2 spongiform nodules which measure 1.9 x 1.2 x 1.5 cm and 1.8 x 1.0 x 1.3 cm and are centrally unchanged. A 3rd spongiform nodule is seen in the inferior pole the left lobe of the thyroid which measures 1.4 x 0.9 x 1.3 cm. It has decreased in size  1/19: FNA thyroid: Benign follicular cells, cyst contents and colloid.  US 4/20: IMPRESSION: Stable multinodular goiter since December   US 1/23: US 1/123:   NODULE 1: Location: Right lower pole   Size: 2.5 x 2.3 x 2.1 cm, previously 1.4 x 1.3 x 1.2 cm.   NODULE 2: Location: Right lower   Size: 3.2 x 2.5 x 1.8 cm, not measured previously   NODULE 3: Location: Left mid   Size: 2.1 x 1.6 x 1.2 cm, previously 1.8 x 1.2 x 1.2 cm   NODULE 4: Location: Left lower pole   Size: 1.5 x 1.5 x 1.2 cm, previously 1.7 x 1.5 x 1.2 cm   TI-RADS Category: TR5 and one TR4  ACR Recommendation: TI-RADS 5 nodule.  FNA is advised.   Endo note 1/23: \"Reviewed US images. " "Previously biopsied right and left lower/isthmus nodules remain stable from the time of their biopsies. Reported 3cm right nodule appears to be more of pseudonodule with cystic areas but not clearly a nodule. The left mid 2.1cm nodule also does not clearly appear to be a nodule, but is stable regardless. Would recommend continued monitoring\"  US 7/24:   NODULE 1: Location: Inferior right thyroid lobe Size: 2. 1 x 2.5 x 2.3 cm   NODULE 2: Location: Inferior right thyroid lobe Size: 3.5 x 2.9 x 2.0 cm   NODULE 3: Location: Left mid Size: 2.2 x 1.9 x 1.5 cm   NODULE 4: Location: Left thyroid isthmus Size: 1.8 x 1.2 x 1.2 cm   FNA recommend by radiology  Sees Eyal         Current Assessment & Plan   F/U with Endo as scheduled         Poison ivy dermatitis    Relevant Medications    hydrocortisone 1 % cream    predniSONE (Deltasone) 20 mg tablet    RESOLVED: Primary hypertension    Overview   11/25/24:  Office cuff 138/83 HR 76  Home cuff 148/84 HR 79         Vitamin D deficiency    Overview   Goal 40-50  ON supplement         Relevant Orders    Vitamin D 25-Hydroxy,Total (for eval of Vitamin D levels)    Vitamin D 25-Hydroxy,Total (for eval of Vitamin D levels)         ROS otherwise negative aside from what was mentioned above in HPI.    Vitals  /76   Temp 37 °C (98.6 °F)   Ht 1.6 m (5' 3\")   Wt 83 kg (183 lb)   SpO2 94%   BMI 32.42 kg/m²   Body mass index is 32.42 kg/m².  Physical Exam  Gen: Alert, NAD  HEENT:  Unremarkable  Neck:  No ILDEFONSO  Respiratory:  Lungs CTAB  Cardiovascular:  Heart RRR  Neuro:  Gross motor and sensory intact  Skin:  No suspicious lesions present  Breast: No masses, or axillary lymphadenopathy      Patient Care Team:  Zara Calvin MD as PCP - General  Zara Calvin MD as PCP - [facility] Attributed Provider  Catracho Cody MD as Consulting Physician (Orthopaedic Surgery)       Health Risk Assessment:  Patient was asked if he/she has any difficulty performing the following activities " of daily living:  Preparing nutritious food and eating? No  Grocery shopping? No  Bathing and grooming yourself? No  Getting dressed? No  Using the toilet?No  Using the phone? No  Moving around from place to place (physical ambulation)? No  Doing housework (including laundry) independently? No  Managing finances independently? No  Managing medications independently? No  Doing housework (including laundry) independently? No    Patient was asked if he/she:  Feels safe in current home environment?: Yes  Uses seatbelt? Yes  Sees the dentist regularly? Yes  Exercises regularly: Yes  Suffers from depression, stress, anger, loneliness or social isolation, pain, suicidality? No    Dietary issues discussed: Yes  Cognitive Impairment No  Hearing difficulties: No  Visual Acuity assessed: No    What is your self-assessment of overall health status and life satisfaction? Good     5 minutes spent on SDOH screening:   Specifically Housing, Food Insecurity, Utilities and Transportatin Needs were evaluated   (See Screenings in Rooming section for documentation)  Food Insecurity: No Food Insecurity (5/13/2025)    Hunger Vital Sign     Worried About Running Out of Food in the Last Year: Never true     Ran Out of Food in the Last Year: Never true     Housing Stability: Unknown (5/13/2025)    Housing Stability Vital Sign     Unable to Pay for Housing in the Last Year: No     Number of Times Moved in the Last Year: Not on file     Homeless in the Last Year: No     Transportation Needs: No Transportation Needs (5/13/2025)    PRAPARE - Transportation     Lack of Transportation (Medical): No     Lack of Transportation (Non-Medical): No         Allergies and Medications  Chocolate  Current Outpatient Medications   Medication Instructions    ascorbic acid (Vitamin C) 1,000 mg tablet 1 tablet, Daily    calcium crb,[id number] 300-200-13.5 mg-unit-mg tablet 1 tablet, Daily    cetirizine (ZYRTEC) 10 mg, Daily RT    cholecalciferol  (VITAMIN D-3) 125 mcg, Daily    fish oil concentrate (Fish OiL) 120-180 mg capsule 3 g, Daily    hydrocortisone 1 % cream 1 Application, Topical, As needed    MAGNESIUM GLYCINATE ORAL 1 tablet, Daily    melatonin 3 mg tablet,disintegrating 1 tablet, As needed    methIMAzole (TAPAZOLE) 2.5 mg, oral, Daily    predniSONE (DELTASONE) 20 mg, oral, 2 times daily    QUERCETIN ORAL 1 tablet, As needed    zinc sulfate (Zincate) 220 (50 Zn) MG capsule 50 mg of elemental zinc, Daily       Medications and Supplements  prescribed by me and other practitioners or clinical pharmacist (such as prescriptions, OTC's, herbal therapies and supplements) were reviewed and documented in the medical record.      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)    Retired    Nonsmoker    Occasional ETOH: 2/year    ---    Family History:    M: DM, cataracts, CAD/MI ( 64)    F: Bladder CA,cataracts ( 93)    S: DM, Thyroid CA, Dementia, Colon CA ( age 79)    Son :  Gout,  Pneumonia age 50         Tobacco/Alcohol/Opioid use, as well as Illicit Drug Use was screened for/reviewed and documented in Social Documentation section of the chart and medication list as appropriate     Depression Screening  Depression screening completed using the PHQ-2 questions, with results documented in the chart/encounter   Patient Health Questionnaire-2 Score: 0 (2025 10:48 AM)  (See also Rooming/Screening section for documentation, and/or progress note for additional information)    Cardiac Risk Assessment (15 minutes spent on this)  The 10-year ASCVD risk score (Juan PONCE, et al., 2019) is: 17.3%    Values used to calculate the score:      Age: 75 years      Sex: Female      Is Non- : No      Diabetic: No      Tobacco  smoker: No      Systolic Blood Pressure: 125 mmHg      Is BP treated: No      HDL Cholesterol: 67 mg/dL      Total Cholesterol: 203 mg/dL    Cardiovascular risk was discussed and, if needed, lifestyle modifications recommended, including nutritional choices, exercise, and elimination of habits contributing to risk. We agreed on a plan to reduce the current cardiovascular risk based on above discussion as needed.     Aspirin use/disuse was discussed and documented in the Problem List of the medical record (under Cardiac Risk Counseling) after reviewing the updated guidelines below:  Consider low dose Aspirin ( mg) use if the benefit for cardiovascular disease prevention outweighs risk for bleeding complications.   Discussed that in general, low dose ASA should be considered:  In patients WITHOUT prior MI/stroke/PAD (primary prevention):   a. Age <60: Use if 10-year cardiovascular disease risk >20%, with discussion of risks and benefits with patient  b. Age 60-<70: Use if 10-year cardiovascular disease risk >20% and low bleeding (e.g., gastrointestinal) risk, with discussion of risks and benefits with patient  c. Age >=70: Do not use    In patients WITH prior MI/stroke/PAD (secondary prevention):   Generally use unless extremely high bleeding (e.g., gastrointenstinal) risk, with discussion of risks and benefits with patient    Advance Directives Discussion  Advanced Care Planning (including a Living Will, Healthcare POA, as well as specific end of life choices and/or directives), was discussed with the patient and/or surrogate, voluntarily, and details of that discussion documented in the Problem List (under Advanced Directives Discussion) of the medical record.   (16 min spent discussing above)     During the course of the visit the patient was educated and counseled about age appropriate screening and preventive services.   Completed preventive screenings were documented in the chart (see Routine Health  Maintenance in Problem List) and orders were placed for outstanding screenings/procedures as documented in the Assessment and Plan.    Patient Instructions (the written plan) was given to the patient at check out as part of the AVS.             [1]   Patient Active Problem List  Diagnosis    Acquired renal cyst    Allergic rhinitis    Disorder of bone density and structure, unspecified    Diverticulosis of colon    Grade I hemorrhoids    Hyperthyroidism    Hyperlipidemia, mixed    Multiple thyroid nodules    IFG (impaired fasting glucose)    Prolapse of female pelvic organs    Rectocele    Vitamin D deficiency    Advanced directives, counseling/discussion    BMI 32.0-32.9,adult    Cardiac risk counseling    Routine adult health maintenance    Screening for multiple conditions    Encounter for screening mammogram for breast cancer    Irritable bowel syndrome    Arthritis of knee    CKD (chronic kidney disease) stage 3, GFR 30-59 ml/min (Multi)    Menopause    Screening for colon cancer    Contact dermatitis    Hydronephrosis    Lateral cystocele    Osteoarthritis of right knee    Vitreous degeneration    History of colon polyps    Alcohol screening    Encounter for screening involving social determinants of health (SDoH)    Poison ivy dermatitis   [2]   Past Medical History:  Diagnosis Date    H/O bone density study     9/12/22: Lowest T score -1.6 10-Year Fracture Risk: Major Osteoporotic Fracture 17.9% Hip Fracture                7.5% 3/18: -2.1 FRAX 18, 3.9% 8/6/20: T score -1.8.  FRAX scores:  Major Osteoporotic Fracture: 17.8%                                                            Hip Fracture:                5.6%    H/O bone density study 09/16/2024    Lowest T score -1.8..10-year Fracture Risk: Major Osteoporotic Fracture  21.4% Hip Fracture                        11.9%    H/O CT scan     8/20: CT calcium score=0 Linear subpleural atelectasis is identified within the lingula and lower lobes 5/21 CT  Urogram: 1.  Opacified portions of bilateral ureters appear unremarkable. 2. No significant hydronephrosis. 3. Left parapelvic cysts with additional tiny subcentimeter bilateral renal probable cysts 4. Mild bilateral pelviectasis. 5. Extensive colonic diverticulosis    H/O diagnostic ultrasound     US THyroid 4/20: Stable multinodular goiter since December 2018. No suspicious nodules are demonstrated 12/18: RIGHT LOBE:predominantly solid nodule in the lower pole of the right upper lobe. LEFT LOBE:A mixed solid and cystic nodule in the mid left thyroid lobe  2 x 1.1 x 1.4 cm. Another mixed solid cystic nodule left thyroid lobe 1.8 x 1.3 x 1.1 cm. A. solid nodule isthmus 2 x 1.7 x 1.8 cm.    H/O diagnostic ultrasound     US thyroid: 4/16/22:  RIGHT LOBE: spongiform nodule which measures 2.1 x 1.6 x 2.4 cm. It is essentially unchanged. LEFT LOBE: 2 spongiform nodules which measure 1.9 x 1.2 x 1.5 cm and 1.8 x 1.0 x 1.3 cm and are centrally unchanged. A 3rd spongiform nodule is seen in the inferior pole the left lobe of the thyroid which measures 1.4 x 0.9 x 1.3 cm. It has decreased in size    H/O diagnostic ultrasound     US kidney 4/21: Mild nonspecific apparent left-sided hydronephrosis. Small parapelvic left renal cyst, 12mm. Otherwise unremarkable exam US bldder 2012 (-) US abd 12/18: normal US kidney 9/12/22: Parapelvic midpole cyst measures 16 x 14 x 13 mm today, compared to 9 x 12 x 13 mm previously, mildly larger    H/O diagnostic ultrasound 09/16/2023    US kidney: 1.  No significant sonographic abnormality in either kidney. 2. Stable benign 1.5 cm left renal sinus cyst    H/O echocardiogram     1/18: There is mild upper septal left  ventricular hypertrophy. Left ventricular systolic function is normal. EF = 69   5% (2D biplane) Normal left ventricular diastolic function.  - The right ventricle is normal in size. Right ventricular systolic function is  normal.  - There are no significant valvular  abnormalities.  - RVSP is 19 mmHg c/w normal PA pressure. Estimated RApressure is 0 mmHg    H/O x-ray of lower extremity 05/07/2024    XR Knee 11/23: AP lateral right knee x-ray shows advanced end-stage arthrosis bone-on-bone arthrosis varus deformity osteophyte formation throughout all 3 compartments.  On the lateral view there is severe patellofemoral arthrosis.  No fracture dislocation noted   [3]   Past Surgical History:  Procedure Laterality Date    APPENDECTOMY  08/10/2018    Appendectomy    BREAST BIOPSY Left     benign    CATARACT EXTRACTION  08/10/2018    Cataract Surgery    COLONOSCOPY  12/04/2018    2014: Zahida Pleitez: internal hemorrhoids, diveticulosis    COLONOSCOPY  09/11/2024    Scattered diverticulosis of severe severity containing stool Hemorrhoids. 2 subcentimeter polyps in the cecum were removed with cold forceps biopsy    FINE NEEDLE ASPIRATION  02/25/2019 1/19: FNA thyroid: Benign follicular cells, cyst contents and colloid.    HYSTERECTOMY  09/08/1997    PARTIAL HYSTERECTOMY  02/25/2019    supracervical hysterectomy and BSO

## 2025-05-14 ENCOUNTER — HOSPITAL ENCOUNTER (OUTPATIENT)
Dept: RADIOLOGY | Facility: CLINIC | Age: 75
Discharge: HOME | End: 2025-05-14
Payer: MEDICARE

## 2025-05-14 DIAGNOSIS — N18.31 STAGE 3A CHRONIC KIDNEY DISEASE (MULTI): ICD-10-CM

## 2025-05-14 DIAGNOSIS — N28.1 ACQUIRED RENAL CYST: ICD-10-CM

## 2025-05-14 DIAGNOSIS — N13.30 HYDRONEPHROSIS, UNSPECIFIED HYDRONEPHROSIS TYPE: ICD-10-CM

## 2025-05-14 PROCEDURE — 76770 US EXAM ABDO BACK WALL COMP: CPT | Performed by: STUDENT IN AN ORGANIZED HEALTH CARE EDUCATION/TRAINING PROGRAM

## 2025-05-14 PROCEDURE — 76770 US EXAM ABDO BACK WALL COMP: CPT

## 2025-05-15 LAB
25(OH)D3+25(OH)D2 SERPL-MCNC: 44 NG/ML (ref 30–100)
ALBUMIN SERPL-MCNC: 3.9 G/DL (ref 3.6–5.1)
ALBUMIN/CREAT UR: 9 MG/G CREAT
ALP SERPL-CCNC: 60 U/L (ref 37–153)
ALT SERPL-CCNC: 11 U/L (ref 6–29)
ANION GAP SERPL CALCULATED.4IONS-SCNC: 9 MMOL/L (CALC) (ref 7–17)
APPEARANCE UR: CLEAR
AST SERPL-CCNC: 15 U/L (ref 10–35)
BACTERIA #/AREA URNS HPF: ABNORMAL /HPF
BASOPHILS # BLD AUTO: 29 CELLS/UL (ref 0–200)
BASOPHILS NFR BLD AUTO: 0.4 %
BILIRUB SERPL-MCNC: 0.5 MG/DL (ref 0.2–1.2)
BILIRUB UR QL STRIP: NEGATIVE
BUN SERPL-MCNC: 18 MG/DL (ref 7–25)
CALCIUM SERPL-MCNC: 8.9 MG/DL (ref 8.6–10.4)
CHLORIDE SERPL-SCNC: 106 MMOL/L (ref 98–110)
CHOLEST SERPL-MCNC: 206 MG/DL
CHOLEST/HDLC SERPL: 3.2 (CALC)
CO2 SERPL-SCNC: 27 MMOL/L (ref 20–32)
COLOR UR: YELLOW
CREAT SERPL-MCNC: 0.94 MG/DL (ref 0.6–1)
CREAT UR-MCNC: 110 MG/DL (ref 20–275)
EGFRCR SERPLBLD CKD-EPI 2021: 63 ML/MIN/1.73M2
EOSINOPHIL # BLD AUTO: 374 CELLS/UL (ref 15–500)
EOSINOPHIL NFR BLD AUTO: 5.2 %
ERYTHROCYTE [DISTWIDTH] IN BLOOD BY AUTOMATED COUNT: 14 % (ref 11–15)
GLUCOSE SERPL-MCNC: 103 MG/DL (ref 65–99)
GLUCOSE UR QL STRIP: NEGATIVE
HCT VFR BLD AUTO: 40.7 % (ref 35–45)
HDLC SERPL-MCNC: 64 MG/DL
HGB BLD-MCNC: 12.7 G/DL (ref 11.7–15.5)
HGB UR QL STRIP: NEGATIVE
HYALINE CASTS #/AREA URNS LPF: ABNORMAL /LPF
KETONES UR QL STRIP: NEGATIVE
LDLC SERPL CALC-MCNC: 119 MG/DL (CALC)
LEUKOCYTE ESTERASE UR QL STRIP: ABNORMAL
LYMPHOCYTES # BLD AUTO: 2117 CELLS/UL (ref 850–3900)
LYMPHOCYTES NFR BLD AUTO: 29.4 %
MAGNESIUM SERPL-MCNC: 2.1 MG/DL (ref 1.5–2.5)
MCH RBC QN AUTO: 30.6 PG (ref 27–33)
MCHC RBC AUTO-ENTMCNC: 31.2 G/DL (ref 32–36)
MCV RBC AUTO: 98.1 FL (ref 80–100)
MICROALBUMIN UR-MCNC: 1 MG/DL
MONOCYTES # BLD AUTO: 554 CELLS/UL (ref 200–950)
MONOCYTES NFR BLD AUTO: 7.7 %
NEUTROPHILS # BLD AUTO: 4126 CELLS/UL (ref 1500–7800)
NEUTROPHILS NFR BLD AUTO: 57.3 %
NITRITE UR QL STRIP: NEGATIVE
NONHDLC SERPL-MCNC: 142 MG/DL (CALC)
PH UR STRIP: 5.5 [PH] (ref 5–8)
PLATELET # BLD AUTO: 318 THOUSAND/UL (ref 140–400)
PMV BLD REES-ECKER: 9.6 FL (ref 7.5–12.5)
POTASSIUM SERPL-SCNC: 4.4 MMOL/L (ref 3.5–5.3)
PROT SERPL-MCNC: 6.8 G/DL (ref 6.1–8.1)
PROT UR QL STRIP: NEGATIVE
RBC # BLD AUTO: 4.15 MILLION/UL (ref 3.8–5.1)
RBC #/AREA URNS HPF: ABNORMAL /HPF
SERVICE CMNT-IMP: ABNORMAL
SODIUM SERPL-SCNC: 142 MMOL/L (ref 135–146)
SP GR UR STRIP: 1.02 (ref 1–1.03)
SQUAMOUS #/AREA URNS HPF: ABNORMAL /HPF
TRIGL SERPL-MCNC: 119 MG/DL
WBC # BLD AUTO: 7.2 THOUSAND/UL (ref 3.8–10.8)
WBC #/AREA URNS HPF: ABNORMAL /HPF

## 2025-11-17 ENCOUNTER — APPOINTMENT (OUTPATIENT)
Dept: PRIMARY CARE | Facility: CLINIC | Age: 75
End: 2025-11-17
Payer: MEDICARE

## 2026-06-05 ENCOUNTER — APPOINTMENT (OUTPATIENT)
Dept: PRIMARY CARE | Facility: CLINIC | Age: 76
End: 2026-06-05
Payer: MEDICARE